# Patient Record
Sex: MALE | Race: WHITE | URBAN - METROPOLITAN AREA
[De-identification: names, ages, dates, MRNs, and addresses within clinical notes are randomized per-mention and may not be internally consistent; named-entity substitution may affect disease eponyms.]

---

## 2017-07-17 ENCOUNTER — OFFICE VISIT (OUTPATIENT)
Dept: INTERNAL MEDICINE | Facility: CLINIC | Age: 82
End: 2017-07-17
Payer: COMMERCIAL

## 2017-07-17 VITALS
SYSTOLIC BLOOD PRESSURE: 122 MMHG | DIASTOLIC BLOOD PRESSURE: 70 MMHG | HEART RATE: 96 BPM | HEIGHT: 65 IN | OXYGEN SATURATION: 96 % | WEIGHT: 147.7 LBS | TEMPERATURE: 97.4 F | BODY MASS INDEX: 24.61 KG/M2

## 2017-07-17 DIAGNOSIS — Q05.7 SPINA BIFIDA OF LUMBAR REGION WITHOUT HYDROCEPHALUS (H): ICD-10-CM

## 2017-07-17 DIAGNOSIS — M54.50 MIDLINE LOW BACK PAIN WITHOUT SCIATICA, UNSPECIFIED CHRONICITY: ICD-10-CM

## 2017-07-17 DIAGNOSIS — Z00.00 MEDICARE ANNUAL WELLNESS VISIT, SUBSEQUENT: Primary | ICD-10-CM

## 2017-07-17 DIAGNOSIS — I10 ESSENTIAL HYPERTENSION WITH GOAL BLOOD PRESSURE LESS THAN 140/90: ICD-10-CM

## 2017-07-17 DIAGNOSIS — E55.9 VITAMIN D DEFICIENCY: ICD-10-CM

## 2017-07-17 DIAGNOSIS — M15.9 OSTEOARTHRITIS OF MULTIPLE JOINTS, UNSPECIFIED OSTEOARTHRITIS TYPE: ICD-10-CM

## 2017-07-17 DIAGNOSIS — I10 ESSENTIAL HYPERTENSION: ICD-10-CM

## 2017-07-17 DIAGNOSIS — E78.5 HYPERLIPIDEMIA WITH TARGET LDL LESS THAN 130: ICD-10-CM

## 2017-07-17 LAB
ANION GAP SERPL CALCULATED.3IONS-SCNC: 5 MMOL/L (ref 3–14)
BUN SERPL-MCNC: 12 MG/DL (ref 7–30)
CALCIUM SERPL-MCNC: 9.4 MG/DL (ref 8.5–10.1)
CHLORIDE SERPL-SCNC: 104 MMOL/L (ref 94–109)
CO2 SERPL-SCNC: 30 MMOL/L (ref 20–32)
CREAT SERPL-MCNC: 0.91 MG/DL (ref 0.66–1.25)
GFR SERPL CREATININE-BSD FRML MDRD: 79 ML/MIN/1.7M2
GLUCOSE SERPL-MCNC: 97 MG/DL (ref 70–99)
POTASSIUM SERPL-SCNC: 4.1 MMOL/L (ref 3.4–5.3)
SODIUM SERPL-SCNC: 139 MMOL/L (ref 133–144)

## 2017-07-17 PROCEDURE — 99397 PER PM REEVAL EST PAT 65+ YR: CPT | Performed by: INTERNAL MEDICINE

## 2017-07-17 PROCEDURE — 36415 COLL VENOUS BLD VENIPUNCTURE: CPT | Performed by: INTERNAL MEDICINE

## 2017-07-17 PROCEDURE — 80048 BASIC METABOLIC PNL TOTAL CA: CPT | Performed by: INTERNAL MEDICINE

## 2017-07-17 RX ORDER — ACETAMINOPHEN 500 MG
1000 TABLET ORAL 2 TIMES DAILY PRN
Status: ON HOLD | COMMUNITY
Start: 2017-07-17 | End: 2019-01-01

## 2017-07-17 NOTE — PROGRESS NOTES
SUBJECTIVE:   Pepito Hicks is a 87 year old male who presents for Preventive Visit.  Are you in the first 12 months of your Medicare Part B coverage?  No    Healthy Habits:    Do you get at least three servings of calcium containing foods daily (dairy, green leafy vegetables, etc.)? No     Amount of exercise or daily activities, outside of work: a little walking     Problems taking medications regularly No    Medication side effects: No    Have you had an eye exam in the past two years? yes    Do you see a dentist twice per year? No, has dentures     Do you have sleep apnea, excessive snoring or daytime drowsiness?no    COGNITIVE SCREEN  1) Repeat 3 items (Banana, Sunrise, Chair)   2) Clock draw: ABNORMAL, hands not in the correct spot   3) 3 item recall: Recalls NO objects   Results: 0 items recalled: PROBABLE COGNITIVE IMPAIRMENT, **INFORM PROVIDER**    Mini-CogTM Copyright NABEEL Gutierrez. Licensed by the author for use in [facility]; reprinted with permission (joshua@[facility]). All rights reserved.      Additional issues to address:  1.  Follow-up HTN.  Patient is checking outpatient blood pressures, at home.  Results are 106//92.  He reports no side effects from BP medications.   No orthostatic symptoms.    2.  Using cane at home and when out.    - While making breakfast in January, had a fall while carrying milk to fridge.   Bruised, very sore, but no significant injuries.   R foot and toes ecchymotic.  R shoulder pain for a while.   Didn't injure his spine, head.    - Used tylenol 1000 mg bid through March, little now.    -   3.  Low back pain, waist area in middle, ongoing.   Worst when more active.   Whole spinal area is tender to touch.       Reviewed and updated as needed this visit by clinical staff  Tobacco  Allergies         Reviewed and updated as needed this visit by Provider        Social History   Substance Use Topics     Smoking status: Never Smoker     Smokeless tobacco:  "Never Used     Alcohol use Yes      Comment: SOCIAL       The patient does not drink >3 drinks per day nor >7 drinks per week.    Today's PHQ-2 Score:   PHQ-2 ( 1999 Pfizer) 7/17/2017 7/17/2017   Q1: Little interest or pleasure in doing things 0 0   Q2: Feeling down, depressed or hopeless 0 0   PHQ-2 Score 0 0       Do you feel safe in your environment - Yes    Do you have a Health Care Directive?: Yes: Advance Directive has been received and scanned.    Current providers sharing in care for this patient include:   Patient Care Team:  Mikal Roque MD as PCP - General (Internal Medicine)      Hearing impairment: Yes, Need to ask people to speak up or repeat themselves.    Ability to successfully perform activities of daily living: No, needs assistance with: preparing meals     Fall risk:  Fallen 2 or more times in the past year?: No  Any fall with injury in the past year?: Yes  Timed Up and Go Test (>13.5 is fall risk; contact physician) : 15    Home safety:  none identified      The following health maintenance items are reviewed in Epic and correct as of today:  Health Maintenance   Topic Date Due     FALL RISK ASSESSMENT  09/25/2015     ADVANCE DIRECTIVE PLANNING Q5 YRS  08/07/2020     TETANUS IMMUNIZATION (SYSTEM ASSIGNED)  10/02/2023     PNEUMOCOCCAL  Completed       ROS:  Constitutional, HEENT, cardiovascular, pulmonary, gi and gu systems are negative, except as otherwise noted.    OBJECTIVE:   /70  Pulse 96  Temp 97.4  F (36.3  C) (Oral)  Ht 5' 5\" (1.651 m)  Wt 147 lb 11.2 oz (67 kg)  SpO2 96%  BMI 24.58 kg/m2 Estimated body mass index is 24.06 kg/(m^2) as calculated from the following:    Height as of 7/1/16: 5' 5\" (1.651 m).    Weight as of 7/1/16: 144 lb 9.6 oz (65.6 kg).  EXAM:   Several lesions on top of head consistent with known recent biopsies.  GENERAL: healthy, alert and no distress  NECK: no adenopathy, no asymmetry, masses, or scars and thyroid normal to palpation  RESP: lungs " "clear to auscultation - no rales, rhonchi or wheezes  CV: regular rate and rhythm, normal S1 S2, no S3 or S4, no murmur, click or rub, no peripheral edema and peripheral pulses strong  ABDOMEN: soft, nontender, no hepatosplenomegaly, no masses and bowel sounds normal  MS: no gross musculoskeletal defects noted, no edema  Soft tissue, postop prominence lumbar area, as before.  No fluid accumulation.  Mild tenderness of posterior spine from lumbar to lower cervical spine    see labs     ASSESSMENT / PLAN:     ASSESSMENT:   1.  Annual Wellness Visit  2.  HTN, controlled   3.  Back pain after fall 6 months ago.  4.  Chronic myalgias, currently off medication  5.  Spina Bifida, still affects his walking, but managing fairly well.  6.  Possible fibromyalgia  7.  History of skin cancer, recent biopsies on scalp  8.  Vitamin D deficiency, untreated.    PLAN:  1.  Consider retrial of vitamin D 1000 units daily.    2.  Try taking fiber supplement along with the vitamin D, to prevent constipation from vitamin D.   Metamucil, citrucel, fibercon, fibersure, etc.     Alternatively, you could drink a glass of prune juice daily.    3.  Continue present medications   4.  Check labs again in a year, then see MD.       End of Life Planning:  Patient currently has an advanced directive: Yes.  Practitioner is supportive of decision.    COUNSELING:  Reviewed preventive health counseling, as reflected in patient instructions       Vision screening      Estimated body mass index is 24.06 kg/(m^2) as calculated from the following:    Height as of 7/1/16: 5' 5\" (1.651 m).    Weight as of 7/1/16: 144 lb 9.6 oz (65.6 kg).     reports that he has never smoked. He has never used smokeless tobacco.      Appropriate preventive services were discussed with this patient, including applicable screening as appropriate for cardiovascular disease, diabetes, osteopenia/osteoporosis, and glaucoma.  As appropriate for age/gender, discussed screening for " colorectal cancer, prostate cancer, breast cancer, and cervical cancer. Checklist reviewing preventive services available has been given to the patient.    Reviewed patients plan of care and provided an AVS. The Basic Care Plan (routine screening as documented in Health Maintenance) for Pepito meets the Care Plan requirement. This Care Plan has been established and reviewed with the Patient.    Counseling Resources:  ATP IV Guidelines  Pooled Cohorts Equation Calculator  Breast Cancer Risk Calculator  FRAX Risk Assessment  ICSI Preventive Guidelines  Dietary Guidelines for Americans, 2010  USDA's MyPlate  ASA Prophylaxis  Lung CA Screening    Mikal Roque MD  [facility]

## 2017-07-17 NOTE — PATIENT INSTRUCTIONS
Preventive Health Recommendations:       Male Ages 65 and over    Yearly exam:             See your health care provider every year in order to  o   Review health changes.   o   Discuss preventive care.    o   Review your medicines if your doctor has prescribed any.    Talk with your health care provider about whether you should have a test to screen for prostate cancer (PSA).    Every 3 years, have a diabetes test (fasting glucose). If you are at risk for diabetes, you should have this test more often.    Every 5 years, have a cholesterol test. Have this test more often if you are at risk for high cholesterol or heart disease.     Every 10 years, have a colonoscopy. Or, have a yearly FIT test (stool test). These exams will check for colon cancer.    Talk to with your health care provider about screening for Abdominal Aortic Aneurysm if you have a family history of AAA or have a history of smoking.  Shots:     Get a flu shot each year.     Get a tetanus shot every 10 years.     Talk to your doctor about your pneumonia vaccines. There are now two you should receive - Pneumovax (PPSV 23) and Prevnar (PCV 13).    Talk to your doctor about a shingles vaccine.     Talk to your doctor about the hepatitis B vaccine.  Nutrition:     Eat at least 5 servings of fruits and vegetables each day.     Eat whole-grain bread, whole-wheat pasta and brown rice instead of white grains and rice.     Talk to your doctor about Calcium and Vitamin D.   Lifestyle    Exercise for at least 150 minutes a week (30 minutes a day, 5 days a week). This will help you control your weight and prevent disease.     Limit alcohol to one drink per day.     No smoking.     Wear sunscreen to prevent skin cancer.     See your dentist every six months for an exam and cleaning.     See your eye doctor every 1 to 2 years to screen for conditions such as glaucoma, macular degeneration and cataracts.      PLAN:  1.  Consider retrial of vitamin D 1000 units  daily.    2.  Try taking fiber supplement along with the vitamin D, to prevent constipation from vitamin D.   Metamucil, citrucel, fibercon, fibersure, etc.     Alternatively, you could drink a glass of prune juice daily.    3.  Continue present medications   4.  Check labs again in a year, then see MD.

## 2017-07-17 NOTE — LETTER
[address] [address]  [phone]        July 23, 2018    Pepito Hicks  [address] [facility] [address]              Dear Pepito Hicks    This is to remind you that your fasting labs is due.    You may call our office at [phone] to schedule an appointment.    Please disregard this notice if you have already had your labs drawn or made an appointment.        Sincerely,        Mikal Roque MD

## 2017-07-17 NOTE — NURSING NOTE
"Chief Complaint   Patient presents with     Physical       Initial /70  Pulse 96  Temp 97.4  F (36.3  C) (Oral)  Ht 5' 5\" (1.651 m)  Wt 147 lb 11.2 oz (67 kg)  SpO2 96%  BMI 24.58 kg/m2 Estimated body mass index is 24.58 kg/(m^2) as calculated from the following:    Height as of this encounter: 5' 5\" (1.651 m).    Weight as of this encounter: 147 lb 11.2 oz (67 kg).  Medication Reconciliation: complete   rBi Humphries MA   "

## 2017-07-17 NOTE — MR AVS SNAPSHOT
After Visit Summary   7/17/2017    Pepito Hicks    MRN: [id number]           Patient Information     Date Of Birth          3/19/1930        Visit Information        Provider Department      7/17/2017 1:30 PM Mikal Roque MD [facility]        Today's Diagnoses     Medicare annual wellness visit, subsequent    -  1    Spina bifida of lumbar region without hydrocephalus (H)        Essential hypertension        Osteoarthritis of multiple joints, unspecified osteoarthritis type        Midline low back pain without sciatica, unspecified chronicity        Vitamin D deficiency        Hyperlipidemia with target LDL less than 130          Care Instructions      Preventive Health Recommendations:       Male Ages 65 and over    Yearly exam:             See your health care provider every year in order to  o   Review health changes.   o   Discuss preventive care.    o   Review your medicines if your doctor has prescribed any.    Talk with your health care provider about whether you should have a test to screen for prostate cancer (PSA).    Every 3 years, have a diabetes test (fasting glucose). If you are at risk for diabetes, you should have this test more often.    Every 5 years, have a cholesterol test. Have this test more often if you are at risk for high cholesterol or heart disease.     Every 10 years, have a colonoscopy. Or, have a yearly FIT test (stool test). These exams will check for colon cancer.    Talk to with your health care provider about screening for Abdominal Aortic Aneurysm if you have a family history of AAA or have a history of smoking.  Shots:     Get a flu shot each year.     Get a tetanus shot every 10 years.     Talk to your doctor about your pneumonia vaccines. There are now two you should receive - Pneumovax (PPSV 23) and Prevnar (PCV 13).    Talk to your doctor about a shingles vaccine.     Talk to your doctor about the hepatitis B  vaccine.  Nutrition:     Eat at least 5 servings of fruits and vegetables each day.     Eat whole-grain bread, whole-wheat pasta and brown rice instead of white grains and rice.     Talk to your doctor about Calcium and Vitamin D.   Lifestyle    Exercise for at least 150 minutes a week (30 minutes a day, 5 days a week). This will help you control your weight and prevent disease.     Limit alcohol to one drink per day.     No smoking.     Wear sunscreen to prevent skin cancer.     See your dentist every six months for an exam and cleaning.     See your eye doctor every 1 to 2 years to screen for conditions such as glaucoma, macular degeneration and cataracts.      PLAN:  1.  Consider retrial of vitamin D 1000 units daily.    2.  Try taking fiber supplement along with the vitamin D, to prevent constipation from vitamin D.   Metamucil, citrucel, fibercon, fibersure, etc.     Alternatively, you could drink a glass of prune juice daily.    3.  Continue present medications   4.  Check labs again in a year, then see MD.             Follow-ups after your visit        Future tests that were ordered for you today     Open Future Orders        Priority Expected Expires Ordered    Vitamin D Deficiency Routine 7/1/2018 7/17/2018 7/17/2017    Basic metabolic panel Routine 7/1/2018 7/17/2018 7/17/2017    PAF COMPLETED Routine 7/1/2018 7/17/2018 7/17/2017    Lipid panel reflex to direct LDL Routine 7/1/2018 7/17/2018 7/17/2017            Who to contact     If you have questions or need follow up information about today's clinic visit or your schedule please contact [facility] directly at [phone].  Normal or non-critical lab and imaging results will be communicated to you by MyChart, letter or phone within 4 business days after the clinic has received the results. If you do not hear from us within 7 days, please contact the clinic through MyChart or phone. If you have a critical or abnormal lab result,  "we will notify you by phone as soon as possible.  Submit refill requests through [facility] or call your pharmacy and they will forward the refill request to us. Please allow 3 business days for your refill to be completed.          Additional Information About Your Visit        Ubersnaphart Information     [facility] lets you send messages to your doctor, view your test results, renew your prescriptions, schedule appointments and more. To sign up, go to [email or web address] . Click on \"Log in\" on the left side of the screen, which will take you to the Welcome page. Then click on \"Sign up Now\" on the right side of the page.     You will be asked to enter the access code listed below, as well as some personal information. Please follow the directions to create your username and password.     Your access code is: [email or web address]  Expires: 10/15/2017  2:41 PM     Your access code will  in 90 days. If you need help or a new code, please call your [place] clinic or [phone].        Care EveryWhere ID     This is your Care EveryWhere ID. This could be used by other organizations to access your [place] medical records  [id number]        Your Vitals Were     Pulse Temperature Height Pulse Oximetry BMI (Body Mass Index)       96 97.4  F (36.3  C) (Oral) 5' 5\" (1.651 m) 96% 24.58 kg/m2        Blood Pressure from Last 3 Encounters:   17 122/70   16 134/76   01/22/15 130/70    Weight from Last 3 Encounters:   17 147 lb 11.2 oz (67 kg)   16 144 lb 9.6 oz (65.6 kg)   01/22/15 140 lb 6.4 oz (63.7 kg)                 Today's Medication Changes          These changes are accurate as of: 17  2:41 PM.  If you have any questions, ask your nurse or doctor.               Start taking these medicines.        Dose/Directions    acetaminophen 500 MG tablet   Commonly known as:  TYLENOL   Used for:  Osteoarthritis of multiple joints, unspecified osteoarthritis type   Started by:  Mikal Roque MD        " Dose:  1000 mg   Take 2 tablets (1,000 mg) by mouth 2 times daily as needed for mild pain   Refills:  0            Where to get your medicines      Some of these will need a paper prescription and others can be bought over the counter.  Ask your nurse if you have questions.     You don't need a prescription for these medications     acetaminophen 500 MG tablet                Primary Care Provider Office Phone # Fax #    Mikal Lonny Roque [phone]       [facility] [place] [address] [facility] [place] [address]        Equal Access to Services     [facility] Hospital[facility]ONE[facility] : Hadii aad ku hadasho Soomaali, waaxda luqadaha, qaybta kaalmada adeegyada, waxyon montez . So [facility] [phone].    ATENCIÓN: Si habla español, tiene a grimaldo disposición servicios gratuitos de asistencia lingüística. [facility] [phone].    We comply with applicable federal civil rights laws and [place] laws. We do not discriminate on the basis of race, color, national origin, age, disability sex, sexual orientation or gender identity.            Thank you!     Thank you for choosing [facility]  for your care. Our goal is always to provide you with excellent care. Hearing back from our patients is one way we can continue to improve our services. Please take a few minutes to complete the written survey that you may receive in the mail after your visit with us. Thank you!             Your Updated Medication List - Protect others around you: Learn how to safely use, store and throw away your medicines at www.disposemymeds.org.          This list is accurate as of: 7/17/17  2:41 PM.  Always use your most recent med list.                   Brand Name Dispense Instructions for use Diagnosis    acetaminophen 500 MG tablet    TYLENOL     Take 2 tablets (1,000 mg) by mouth 2 times daily as needed for mild pain    Osteoarthritis of multiple joints, unspecified osteoarthritis type        amLODIPine 2.5 MG tablet    NORVASC    90 tablet    Take 1 tablet (2.5 mg) by mouth daily    Essential hypertension with goal blood pressure less than 140/90

## 2017-10-02 DIAGNOSIS — I10 ESSENTIAL HYPERTENSION WITH GOAL BLOOD PRESSURE LESS THAN 140/90: ICD-10-CM

## 2017-10-03 RX ORDER — AMLODIPINE BESYLATE 2.5 MG/1
TABLET ORAL
Qty: 90 TABLET | Refills: 2 | Status: SHIPPED | OUTPATIENT
Start: 2017-10-03 | End: 2018-01-01

## 2018-01-01 ENCOUNTER — OFFICE VISIT (OUTPATIENT)
Dept: INTERNAL MEDICINE | Facility: CLINIC | Age: 83
End: 2018-01-01
Payer: COMMERCIAL

## 2018-01-01 ENCOUNTER — TELEPHONE (OUTPATIENT)
Dept: LAB | Facility: CLINIC | Age: 83
End: 2018-01-01

## 2018-01-01 VITALS
WEIGHT: 143.9 LBS | TEMPERATURE: 97.8 F | HEART RATE: 80 BPM | BODY MASS INDEX: 23.95 KG/M2 | SYSTOLIC BLOOD PRESSURE: 136 MMHG | RESPIRATION RATE: 12 BRPM | DIASTOLIC BLOOD PRESSURE: 76 MMHG

## 2018-01-01 DIAGNOSIS — I10 ESSENTIAL HYPERTENSION WITH GOAL BLOOD PRESSURE LESS THAN 140/90: Primary | ICD-10-CM

## 2018-01-01 DIAGNOSIS — M79.7 FIBROMYALGIA: ICD-10-CM

## 2018-01-01 DIAGNOSIS — I10 ESSENTIAL HYPERTENSION WITH GOAL BLOOD PRESSURE LESS THAN 140/90: ICD-10-CM

## 2018-01-01 DIAGNOSIS — Q05.7 SPINA BIFIDA OF LUMBAR REGION WITHOUT HYDROCEPHALUS (H): ICD-10-CM

## 2018-01-01 LAB
ANION GAP SERPL CALCULATED.3IONS-SCNC: 5 MMOL/L (ref 3–14)
BUN SERPL-MCNC: 13 MG/DL (ref 7–30)
CALCIUM SERPL-MCNC: 9.7 MG/DL (ref 8.5–10.1)
CHLORIDE SERPL-SCNC: 102 MMOL/L (ref 94–109)
CO2 SERPL-SCNC: 30 MMOL/L (ref 20–32)
CREAT SERPL-MCNC: 0.86 MG/DL (ref 0.66–1.25)
GFR SERPL CREATININE-BSD FRML MDRD: 84 ML/MIN/1.7M2
GLUCOSE SERPL-MCNC: 105 MG/DL (ref 70–99)
POTASSIUM SERPL-SCNC: 4.3 MMOL/L (ref 3.4–5.3)
SODIUM SERPL-SCNC: 137 MMOL/L (ref 133–144)

## 2018-01-01 PROCEDURE — 99214 OFFICE O/P EST MOD 30 MIN: CPT | Performed by: INTERNAL MEDICINE

## 2018-01-01 PROCEDURE — 36415 COLL VENOUS BLD VENIPUNCTURE: CPT | Performed by: INTERNAL MEDICINE

## 2018-01-01 PROCEDURE — 80048 BASIC METABOLIC PNL TOTAL CA: CPT | Performed by: INTERNAL MEDICINE

## 2018-01-01 RX ORDER — AMLODIPINE BESYLATE 2.5 MG/1
TABLET ORAL
Qty: 30 TABLET | Refills: 0 | Status: SHIPPED | OUTPATIENT
Start: 2018-01-01 | End: 2018-01-01

## 2018-01-01 RX ORDER — AMLODIPINE BESYLATE 2.5 MG/1
2.5 TABLET ORAL DAILY
Qty: 90 TABLET | Refills: 3 | Status: ON HOLD | OUTPATIENT
Start: 2018-01-01 | End: 2019-01-01

## 2018-01-01 RX ORDER — AMLODIPINE BESYLATE 2.5 MG/1
TABLET ORAL
Qty: 90 TABLET | Refills: 0 | Status: SHIPPED | OUTPATIENT
Start: 2018-01-01 | End: 2018-01-01

## 2018-06-25 NOTE — LETTER
[facility]  [address] [address]  [phone]            Pepito [place]ees[place] [id number] [facility] [address]        June 26, 2018    Dear Pepito,    While refilling your prescription today, we noticed that you are due for an appointment with your provider in the near future to follow up on blood pressure .  We will refill your prescription for 30 days, but a follow-up appointment must be made before any additional refills can be approved.     Taking care of your health is important to us and we look forward to seeing you in the near future.  Please call us at [phone] or [phone] (or use [facility]) to schedule an appointment.     Please disregard this notice if you have already made an appointment.    Sincerely,        [facility]

## 2018-06-26 NOTE — TELEPHONE ENCOUNTER
Prescription approved per [facility] Refill Protocol. Sent letter recommending follow up for blood pressure .Celina Be RN

## 2018-09-21 NOTE — LETTER
[facility]  [address] [id number] [phone]            Pepito Keesha [address] [address]        September 24, 2018    Dear Pepito,    While refilling your prescription today, we noticed that you are due for an appointment with your provider.  We will refill your prescription for 30 days, but a follow-up appointment must be made before any additional refills can be approved.     Taking care of your health is important to us and we look forward to seeing you in the near future.  Please call us at [phone] or [phone] (or use [employer]) to schedule an appointment.     Please disregard this notice if you have already made an appointment.    Sincerely,        [facility]

## 2018-09-21 NOTE — TELEPHONE ENCOUNTER
"Requested Prescriptions   Pending Prescriptions Disp Refills     amLODIPine (NORVASC) 2.5 MG tablet [Pharmacy Med Name: AMLODIPINE BESYLATE 2.5 MG TAB] 90 tablet 0    Last Written Prescription Date:  6/26/2018  Last Fill Quantity: 90,  # refills: 0   Last office visit: 7/17/2017 with prescribing provider:  7/17/2017   Future Office Visit:     Sig: TAKE 1 TABLET (2.5 MG) BY MOUTH DAILY    Calcium Channel Blockers Protocol  Failed    9/21/2018  1:27 AM       Failed - Blood pressure under 140/90 in past 12 months    BP Readings from Last 3 Encounters:   07/17/17 122/70   07/01/16 134/76   01/22/15 130/70                Failed - Recent (12 mo) or future (30 days) visit within the authorizing provider's specialty    Patient had office visit in the last 12 months or has a visit in the next 30 days with authorizing provider or within the authorizing provider's specialty.  See \"Patient Info\" tab in inbasket, or \"Choose Columns\" in Meds & Orders section of the refill encounter.           Failed - Normal serum creatinine on file in past 12 months    Recent Labs   Lab Test  07/17/17   1304   CR  0.91            Passed - Patient is age 18 or older          "

## 2018-10-15 NOTE — PROGRESS NOTES
"  SUBJECTIVE:   Pepito Hicks is a 88 year old male who presents to clinic today for the following health issues:      Hypertension Follow-up      Outpatient blood pressures are being checked at home.  Results are normal (110-120 systolic.    Low Salt Diet: no added salt      Amount of exercise or physical activity: 1 day/week for an average of 15-30 minutes    Problems taking medications regularly: No    Medication side effects: none    Diet: regular (no restrictions)            Problem list and histories reviewed & adjusted, as indicated.  Additional history:     Team appointment.  PCP is Dr. Roque.  Patient is a very pleasant but somewhat long-winded individual, who is here for follow-up HTN.  He continues on amlodipine daily, which he tolerates without any difficulty.  Needs refills for the next year.    He carries a number of diagnoses that result in mild chronic pain, including \"fibromyalgia,\" and spina bifida.  He has chronic anterior chest wall pain which is attributed to fibromyalgia, and lower extremity radicular pain.  Fortunately, the patient's pain is satisfactorily controlled with only a maximum of 1000 mg of Tylenol per day.  He asks for reassurance that this is acceptable.    No other acute issues to discuss today.    Reviewed and updated as needed this visit by clinical staff  Tobacco  Allergies  Meds       Reviewed and updated as needed this visit by Provider         ROS:  Constitutional, HEENT, cardiovascular, pulmonary, GI, , musculoskeletal, neuro, skin, endocrine and psych systems are negative, except as otherwise noted.    OBJECTIVE:     /76  Pulse 80  Temp 97.8  F (36.6  C) (Oral)  Resp 12  Wt 143 lb 14.4 oz (65.3 kg)  BMI 23.95 kg/m2  Body mass index is 23.95 kg/(m^2).  GENERAL: healthy, alert and no distress  NECK: no adenopathy, no asymmetry, masses, or scars and thyroid normal to palpation  RESP: lungs clear to auscultation - no rales, rhonchi or wheezes  CV: " regular rate and rhythm, normal S1 S2, no S3 or S4, no murmur, click or rub, no peripheral edema and peripheral pulses strong  ABDOMEN: soft, nontender, no hepatosplenomegaly, no masses and bowel sounds normal  MS: no gross musculoskeletal defects noted, no edema        ASSESSMENT/PLAN:     1. Essential hypertension with goal blood pressure less than 140/90  Continue amlodipine once daily, blood pressure reasonably well controlled.  - amLODIPine (NORVASC) 2.5 MG tablet; Take 1 tablet (2.5 mg) by mouth daily  Dispense: 90 tablet; Refill: 3  - Basic metabolic panel  (Ca, Cl, CO2, Creat, Gluc, K, Na, BUN)    2. Fibromyalgia, probable  Reassured patient that the Tylenol at maximum of 1000 mg daily is perfectly safe.  He does not take this much every day, usually only takes 1 500 mg dose daily, if that.    3. Spina bifida of lumbar region without hydrocephalus (H)  Pain control as above.          Lucio Jennings MD  [facility]

## 2018-10-15 NOTE — MR AVS SNAPSHOT
"              After Visit Summary   10/15/2018    Pepito Hicks    MRN: [id number]           Patient Information     Date Of Birth          3/19/1930        Visit Information        Provider Department      10/15/2018 2:30 PM Lucio Jennings MD [facility]        Today's Diagnoses     Essential hypertension with goal blood pressure less than 140/90    -  1    Fibromyalgia, probable        Spina bifida of lumbar region without hydrocephalus (H)           Follow-ups after your visit        Your next 10 appointments already scheduled     Jan 25, 2019 10:30 AM CST   SHORT with Mikal Roque MD   [facility] ([facility]    [address] [address] [phone]              Who to contact     If you have questions or need follow up information about today's clinic visit or your schedule please contact [facility] directly at [phone].  Normal or non-critical lab and imaging results will be communicated to you by MyChart, letter or phone within 4 business days after the clinic has received the results. If you do not hear from us within 7 days, please contact the clinic through Reduxhart or phone. If you have a critical or abnormal lab result, we will notify you by phone as soon as possible.  Submit refill requests through iConText or call your pharmacy and they will forward the refill request to us. Please allow 3 business days for your refill to be completed.          Additional Information About Your Visit        MyChart Information     iConText lets you send messages to your doctor, view your test results, renew your prescriptions, schedule appointments and more. To sign up, go to [email or web address] . Click on \"Log in\" on the left side of the screen, which will take you to the Welcome page. Then click on \"Sign up Now\" on the right side of the page.     You will be asked to " enter the access code listed below, as well as some personal information. Please follow the directions to create your username and password.     Your access code is: [email or web address]  Expires: 2019  4:09 PM     Your access code will  in 90 days. If you need help or a new code, please call your [facility] clinic or [phone].        Care EveryWhere ID     This is your Care EveryWhere ID. This could be used by other organizations to access your [facility] medical records  [id number]        Your Vitals Were     Pulse Temperature Respirations BMI (Body Mass Index)          80 97.8  F (36.6  C) (Oral) 12 23.95 kg/m2         Blood Pressure from Last 3 Encounters:   10/15/18 136/76   17 122/70   16 134/76    Weight from Last 3 Encounters:   10/15/18 143 lb 14.4 oz (65.3 kg)   17 147 lb 11.2 oz (67 kg)   16 144 lb 9.6 oz (65.6 kg)              We Performed the Following     Basic metabolic panel  (Ca, Cl, CO2, Creat, Gluc, K, Na, BUN)          Where to get your medicines      These medications were sent to [facility] [address] [place] [id number] [place] [id number]     Phone:  [phone]     amLODIPine 2.5 MG tablet          Primary Care Provider Office Phone # Fax #    Mikal Lonny Roque [phone]       [address] [address]        Equal Access to Services     [facility]: Hadii ge reagan hadasho Sohilario, waaxda luqadaha, qaybta kaalmada christi, erum montez . So [facility] [phone].    ATENCIÓN: Si habla español, tiene a grimaldo disposición servicios gratuitos de asistencia lingüística. Llame al [phone].    We comply with applicable federal civil rights laws and [place] laws. We do not discriminate on the basis of race, color, national origin, age, disability, sex, sexual orientation, or gender identity.            Thank you!     Thank you for choosing [facility]   for your care. Our goal is always to provide you with excellent care. Hearing back from our patients is one way we can continue to improve our services. Please take a few minutes to complete the written survey that you may receive in the mail after your visit with us. Thank you!             Your Updated Medication List - Protect others around you: Learn how to safely use, store and throw away your medicines at www.disposemymeds.org.          This list is accurate as of 10/15/18  4:09 PM.  Always use your most recent med list.                   Brand Name Dispense Instructions for use Diagnosis    acetaminophen 500 MG tablet    TYLENOL     Take 2 tablets (1,000 mg) by mouth 2 times daily as needed for mild pain    Osteoarthritis of multiple joints, unspecified osteoarthritis type       amLODIPine 2.5 MG tablet    NORVASC    90 tablet    Take 1 tablet (2.5 mg) by mouth daily    Essential hypertension with goal blood pressure less than 140/90

## 2018-10-15 NOTE — LETTER
10/15/2018         Pepito Hicks  [address] [facility] [address]            Dear Mr. Hicks,    I am writing to inform you of the lab tests you had performed recently.      Your lab results are as follows:    Kidney function: NORMAL  Electrolytes: NORMAL  Glucose: NORMAL    Your lab results from today were all perfectly normal - please continue all medications as usual.    Thank you for allowing me to participate in your care.  If you have further questions, please contact us at [phone].      Sincerely,        CHANDRIKA Jennings MD  Dept. of Internal Medicine  [facility]

## 2019-01-01 ENCOUNTER — APPOINTMENT (OUTPATIENT)
Dept: SPEECH THERAPY | Facility: CLINIC | Age: 84
DRG: 061 | End: 2019-01-01
Payer: COMMERCIAL

## 2019-01-01 ENCOUNTER — APPOINTMENT (OUTPATIENT)
Dept: SPEECH THERAPY | Facility: CLINIC | Age: 84
DRG: 061 | End: 2019-01-01
Attending: HOSPITALIST
Payer: COMMERCIAL

## 2019-01-01 ENCOUNTER — APPOINTMENT (OUTPATIENT)
Dept: OCCUPATIONAL THERAPY | Facility: CLINIC | Age: 84
DRG: 061 | End: 2019-01-01
Payer: COMMERCIAL

## 2019-01-01 ENCOUNTER — NURSING HOME VISIT (OUTPATIENT)
Dept: GERIATRICS | Facility: CLINIC | Age: 84
End: 2019-01-01
Payer: MEDICARE

## 2019-01-01 ENCOUNTER — APPOINTMENT (OUTPATIENT)
Dept: GENERAL RADIOLOGY | Facility: CLINIC | Age: 84
DRG: 061 | End: 2019-01-01
Attending: INTERNAL MEDICINE
Payer: COMMERCIAL

## 2019-01-01 ENCOUNTER — APPOINTMENT (OUTPATIENT)
Dept: PHYSICAL THERAPY | Facility: CLINIC | Age: 84
DRG: 061 | End: 2019-01-01
Payer: COMMERCIAL

## 2019-01-01 ENCOUNTER — APPOINTMENT (OUTPATIENT)
Dept: CT IMAGING | Facility: CLINIC | Age: 84
DRG: 061 | End: 2019-01-01
Attending: EMERGENCY MEDICINE
Payer: COMMERCIAL

## 2019-01-01 ENCOUNTER — APPOINTMENT (OUTPATIENT)
Dept: PHYSICAL THERAPY | Facility: CLINIC | Age: 84
DRG: 061 | End: 2019-01-01
Attending: HOSPITALIST
Payer: COMMERCIAL

## 2019-01-01 ENCOUNTER — APPOINTMENT (OUTPATIENT)
Dept: OCCUPATIONAL THERAPY | Facility: CLINIC | Age: 84
DRG: 061 | End: 2019-01-01
Attending: HOSPITALIST
Payer: COMMERCIAL

## 2019-01-01 ENCOUNTER — TELEPHONE (OUTPATIENT)
Dept: INTERNAL MEDICINE | Facility: CLINIC | Age: 84
End: 2019-01-01

## 2019-01-01 ENCOUNTER — OFFICE VISIT (OUTPATIENT)
Dept: URGENT CARE | Facility: URGENT CARE | Age: 84
End: 2019-01-01
Payer: COMMERCIAL

## 2019-01-01 ENCOUNTER — APPOINTMENT (OUTPATIENT)
Dept: CARDIOLOGY | Facility: CLINIC | Age: 84
DRG: 061 | End: 2019-01-01
Attending: HOSPITALIST
Payer: COMMERCIAL

## 2019-01-01 ENCOUNTER — APPOINTMENT (OUTPATIENT)
Dept: MRI IMAGING | Facility: CLINIC | Age: 84
DRG: 061 | End: 2019-01-01
Attending: PSYCHIATRY & NEUROLOGY
Payer: COMMERCIAL

## 2019-01-01 ENCOUNTER — APPOINTMENT (OUTPATIENT)
Dept: ULTRASOUND IMAGING | Facility: CLINIC | Age: 84
DRG: 061 | End: 2019-01-01
Attending: HOSPITALIST
Payer: COMMERCIAL

## 2019-01-01 ENCOUNTER — HOSPITAL ENCOUNTER (INPATIENT)
Facility: CLINIC | Age: 84
LOS: 6 days | Discharge: HOSPICE | DRG: 061 | End: 2019-05-26
Attending: EMERGENCY MEDICINE | Admitting: HOSPITALIST
Payer: COMMERCIAL

## 2019-01-01 VITALS
RESPIRATION RATE: 17 BRPM | SYSTOLIC BLOOD PRESSURE: 150 MMHG | TEMPERATURE: 98 F | WEIGHT: 141.09 LBS | DIASTOLIC BLOOD PRESSURE: 99 MMHG | OXYGEN SATURATION: 92 % | HEIGHT: 65 IN | BODY MASS INDEX: 23.51 KG/M2 | HEART RATE: 114 BPM

## 2019-01-01 VITALS
OXYGEN SATURATION: 99 % | HEART RATE: 88 BPM | RESPIRATION RATE: 20 BRPM | DIASTOLIC BLOOD PRESSURE: 64 MMHG | TEMPERATURE: 97.8 F | SYSTOLIC BLOOD PRESSURE: 118 MMHG

## 2019-01-01 VITALS
HEART RATE: 96 BPM | RESPIRATION RATE: 20 BRPM | TEMPERATURE: 98 F | OXYGEN SATURATION: 96 % | DIASTOLIC BLOOD PRESSURE: 89 MMHG | SYSTOLIC BLOOD PRESSURE: 146 MMHG

## 2019-01-01 DIAGNOSIS — R45.1 AGITATION: ICD-10-CM

## 2019-01-01 DIAGNOSIS — S80.829A BLISTER OF LEG: ICD-10-CM

## 2019-01-01 DIAGNOSIS — T07.XXXA MULTIPLE OPEN WOUNDS: ICD-10-CM

## 2019-01-01 DIAGNOSIS — Q05.7 SPINA BIFIDA OF LUMBAR REGION WITHOUT HYDROCEPHALUS (H): Primary | ICD-10-CM

## 2019-01-01 DIAGNOSIS — E11.8 TYPE 2 DIABETES MELLITUS WITH COMPLICATION, WITHOUT LONG-TERM CURRENT USE OF INSULIN (H): ICD-10-CM

## 2019-01-01 DIAGNOSIS — K59.00 CONSTIPATION, UNSPECIFIED CONSTIPATION TYPE: ICD-10-CM

## 2019-01-01 DIAGNOSIS — I50.23 ACUTE ON CHRONIC SYSTOLIC CONGESTIVE HEART FAILURE (H): ICD-10-CM

## 2019-01-01 DIAGNOSIS — I48.91 ATRIAL FIBRILLATION, UNSPECIFIED TYPE (H): ICD-10-CM

## 2019-01-01 DIAGNOSIS — R52 PAIN: ICD-10-CM

## 2019-01-01 DIAGNOSIS — I63.89 CEREBROVASCULAR ACCIDENT (CVA) DUE TO OTHER MECHANISM (H): Primary | ICD-10-CM

## 2019-01-01 DIAGNOSIS — I63.9 CEREBROVASCULAR ACCIDENT (CVA), UNSPECIFIED MECHANISM (H): ICD-10-CM

## 2019-01-01 DIAGNOSIS — Z51.5 HOSPICE CARE PATIENT: ICD-10-CM

## 2019-01-01 DIAGNOSIS — S81.809A OPEN WOUND OF LOWER EXTREMITY, UNSPECIFIED LATERALITY, INITIAL ENCOUNTER: Primary | ICD-10-CM

## 2019-01-01 LAB
ALBUMIN SERPL-MCNC: 3.2 G/DL (ref 3.4–5)
ALP SERPL-CCNC: 81 U/L (ref 40–150)
ALT SERPL W P-5'-P-CCNC: 36 U/L (ref 0–70)
ANION GAP SERPL CALCULATED.3IONS-SCNC: 4 MMOL/L (ref 3–14)
ANION GAP SERPL CALCULATED.3IONS-SCNC: 4 MMOL/L (ref 3–14)
ANION GAP SERPL CALCULATED.3IONS-SCNC: 6 MMOL/L (ref 3–14)
ANION GAP SERPL CALCULATED.3IONS-SCNC: 7 MMOL/L (ref 3–14)
APTT PPP: 35 SEC (ref 22–37)
AST SERPL W P-5'-P-CCNC: 34 U/L (ref 0–45)
BACTERIA SPEC CULT: ABNORMAL
BASOPHILS # BLD AUTO: 0 10E9/L (ref 0–0.2)
BASOPHILS # BLD AUTO: 0 10E9/L (ref 0–0.2)
BASOPHILS NFR BLD AUTO: 0.3 %
BASOPHILS NFR BLD AUTO: 0.4 %
BILIRUB DIRECT SERPL-MCNC: 0.4 MG/DL (ref 0–0.2)
BILIRUB SERPL-MCNC: 0.7 MG/DL (ref 0.2–1.3)
BUN SERPL-MCNC: 21 MG/DL (ref 7–30)
BUN SERPL-MCNC: 22 MG/DL (ref 7–30)
BUN SERPL-MCNC: 28 MG/DL (ref 7–30)
BUN SERPL-MCNC: 29 MG/DL (ref 7–30)
CALCIUM SERPL-MCNC: 8.2 MG/DL (ref 8.5–10.1)
CALCIUM SERPL-MCNC: 8.4 MG/DL (ref 8.5–10.1)
CALCIUM SERPL-MCNC: 8.5 MG/DL (ref 8.5–10.1)
CALCIUM SERPL-MCNC: 8.7 MG/DL (ref 8.5–10.1)
CHLORIDE SERPL-SCNC: 107 MMOL/L (ref 94–109)
CHLORIDE SERPL-SCNC: 109 MMOL/L (ref 94–109)
CHLORIDE SERPL-SCNC: 109 MMOL/L (ref 94–109)
CHLORIDE SERPL-SCNC: 110 MMOL/L (ref 94–109)
CHOLEST SERPL-MCNC: 127 MG/DL
CK SERPL-CCNC: 136 U/L (ref 30–300)
CO2 SERPL-SCNC: 29 MMOL/L (ref 20–32)
CO2 SERPL-SCNC: 31 MMOL/L (ref 20–32)
CO2 SERPL-SCNC: 32 MMOL/L (ref 20–32)
CO2 SERPL-SCNC: 33 MMOL/L (ref 20–32)
CREAT SERPL-MCNC: 0.8 MG/DL (ref 0.66–1.25)
CREAT SERPL-MCNC: 0.82 MG/DL (ref 0.66–1.25)
CREAT SERPL-MCNC: 0.84 MG/DL (ref 0.66–1.25)
CREAT SERPL-MCNC: 0.98 MG/DL (ref 0.66–1.25)
CREAT SERPL-MCNC: 0.98 MG/DL (ref 0.66–1.25)
DIFFERENTIAL METHOD BLD: ABNORMAL
DIFFERENTIAL METHOD BLD: ABNORMAL
EOSINOPHIL # BLD AUTO: 0 10E9/L (ref 0–0.7)
EOSINOPHIL # BLD AUTO: 0.3 10E9/L (ref 0–0.7)
EOSINOPHIL NFR BLD AUTO: 0.1 %
EOSINOPHIL NFR BLD AUTO: 5.5 %
ERYTHROCYTE [DISTWIDTH] IN BLOOD BY AUTOMATED COUNT: 14.4 % (ref 10–15)
ERYTHROCYTE [DISTWIDTH] IN BLOOD BY AUTOMATED COUNT: 14.4 % (ref 10–15)
GFR SERPL CREATININE-BSD FRML MDRD: 68 ML/MIN/{1.73_M2}
GFR SERPL CREATININE-BSD FRML MDRD: 68 ML/MIN/{1.73_M2}
GFR SERPL CREATININE-BSD FRML MDRD: 78 ML/MIN/{1.73_M2}
GFR SERPL CREATININE-BSD FRML MDRD: 78 ML/MIN/{1.73_M2}
GFR SERPL CREATININE-BSD FRML MDRD: 79 ML/MIN/{1.73_M2}
GLUCOSE BLDC GLUCOMTR-MCNC: 102 MG/DL (ref 70–99)
GLUCOSE BLDC GLUCOMTR-MCNC: 102 MG/DL (ref 70–99)
GLUCOSE BLDC GLUCOMTR-MCNC: 107 MG/DL (ref 70–99)
GLUCOSE BLDC GLUCOMTR-MCNC: 107 MG/DL (ref 70–99)
GLUCOSE BLDC GLUCOMTR-MCNC: 108 MG/DL (ref 70–99)
GLUCOSE BLDC GLUCOMTR-MCNC: 108 MG/DL (ref 70–99)
GLUCOSE BLDC GLUCOMTR-MCNC: 110 MG/DL (ref 70–99)
GLUCOSE BLDC GLUCOMTR-MCNC: 110 MG/DL (ref 70–99)
GLUCOSE BLDC GLUCOMTR-MCNC: 112 MG/DL (ref 70–99)
GLUCOSE BLDC GLUCOMTR-MCNC: 113 MG/DL (ref 70–99)
GLUCOSE BLDC GLUCOMTR-MCNC: 114 MG/DL (ref 70–99)
GLUCOSE BLDC GLUCOMTR-MCNC: 114 MG/DL (ref 70–99)
GLUCOSE BLDC GLUCOMTR-MCNC: 115 MG/DL (ref 70–99)
GLUCOSE BLDC GLUCOMTR-MCNC: 115 MG/DL (ref 70–99)
GLUCOSE BLDC GLUCOMTR-MCNC: 116 MG/DL (ref 70–99)
GLUCOSE BLDC GLUCOMTR-MCNC: 117 MG/DL (ref 70–99)
GLUCOSE BLDC GLUCOMTR-MCNC: 118 MG/DL (ref 70–99)
GLUCOSE BLDC GLUCOMTR-MCNC: 122 MG/DL (ref 70–99)
GLUCOSE BLDC GLUCOMTR-MCNC: 130 MG/DL (ref 70–99)
GLUCOSE BLDC GLUCOMTR-MCNC: 142 MG/DL (ref 70–99)
GLUCOSE BLDC GLUCOMTR-MCNC: 160 MG/DL (ref 70–99)
GLUCOSE BLDC GLUCOMTR-MCNC: 169 MG/DL (ref 70–99)
GLUCOSE BLDC GLUCOMTR-MCNC: 88 MG/DL (ref 70–99)
GLUCOSE BLDC GLUCOMTR-MCNC: 88 MG/DL (ref 70–99)
GLUCOSE BLDC GLUCOMTR-MCNC: 89 MG/DL (ref 70–99)
GLUCOSE BLDC GLUCOMTR-MCNC: 93 MG/DL (ref 70–99)
GLUCOSE BLDC GLUCOMTR-MCNC: 98 MG/DL (ref 70–99)
GLUCOSE BLDC GLUCOMTR-MCNC: 98 MG/DL (ref 70–99)
GLUCOSE BLDC GLUCOMTR-MCNC: 99 MG/DL (ref 70–99)
GLUCOSE SERPL-MCNC: 112 MG/DL (ref 70–99)
GLUCOSE SERPL-MCNC: 121 MG/DL (ref 70–99)
GLUCOSE SERPL-MCNC: 121 MG/DL (ref 70–99)
GLUCOSE SERPL-MCNC: 123 MG/DL (ref 70–99)
HBA1C MFR BLD: 6.8 % (ref 0–5.6)
HCT VFR BLD AUTO: 38 % (ref 40–53)
HCT VFR BLD AUTO: 40.1 % (ref 40–53)
HDLC SERPL-MCNC: 58 MG/DL
HGB BLD-MCNC: 11.9 G/DL (ref 13.3–17.7)
HGB BLD-MCNC: 12.6 G/DL (ref 13.3–17.7)
IMM GRANULOCYTES # BLD: 0 10E9/L (ref 0–0.4)
IMM GRANULOCYTES # BLD: 0 10E9/L (ref 0–0.4)
IMM GRANULOCYTES NFR BLD: 0 %
IMM GRANULOCYTES NFR BLD: 0.1 %
INR PPP: 1.19 (ref 0.86–1.14)
INTERPRETATION ECG - MUSE: NORMAL
LACTATE BLD-SCNC: 1.1 MMOL/L (ref 0.7–2)
LDLC SERPL CALC-MCNC: 56 MG/DL
LYMPHOCYTES # BLD AUTO: 0.4 10E9/L (ref 0.8–5.3)
LYMPHOCYTES # BLD AUTO: 0.7 10E9/L (ref 0.8–5.3)
LYMPHOCYTES NFR BLD AUTO: 12.1 %
LYMPHOCYTES NFR BLD AUTO: 5.4 %
MCH RBC QN AUTO: 33.1 PG (ref 26.5–33)
MCH RBC QN AUTO: 33.2 PG (ref 26.5–33)
MCHC RBC AUTO-ENTMCNC: 31.3 G/DL (ref 31.5–36.5)
MCHC RBC AUTO-ENTMCNC: 31.4 G/DL (ref 31.5–36.5)
MCV RBC AUTO: 105 FL (ref 78–100)
MCV RBC AUTO: 106 FL (ref 78–100)
MONOCYTES # BLD AUTO: 0.6 10E9/L (ref 0–1.3)
MONOCYTES # BLD AUTO: 1.1 10E9/L (ref 0–1.3)
MONOCYTES NFR BLD AUTO: 10 %
MONOCYTES NFR BLD AUTO: 14.7 %
MRSA DNA SPEC QL NAA+PROBE: POSITIVE
NEUTROPHILS # BLD AUTO: 4.1 10E9/L (ref 1.6–8.3)
NEUTROPHILS # BLD AUTO: 5.9 10E9/L (ref 1.6–8.3)
NEUTROPHILS NFR BLD AUTO: 72 %
NEUTROPHILS NFR BLD AUTO: 79.4 %
NONHDLC SERPL-MCNC: 69 MG/DL
NRBC # BLD AUTO: 0 10*3/UL
NRBC # BLD AUTO: 0 10*3/UL
NRBC BLD AUTO-RTO: 0 /100
NRBC BLD AUTO-RTO: 0 /100
NT-PROBNP SERPL-MCNC: 9323 PG/ML (ref 0–1800)
PLATELET # BLD AUTO: 203 10E9/L (ref 150–450)
PLATELET # BLD AUTO: 208 10E9/L (ref 150–450)
POTASSIUM SERPL-SCNC: 3.6 MMOL/L (ref 3.4–5.3)
POTASSIUM SERPL-SCNC: 3.7 MMOL/L (ref 3.4–5.3)
POTASSIUM SERPL-SCNC: 3.8 MMOL/L (ref 3.4–5.3)
POTASSIUM SERPL-SCNC: 4.2 MMOL/L (ref 3.4–5.3)
PROT SERPL-MCNC: 6.5 G/DL (ref 6.8–8.8)
RBC # BLD AUTO: 3.58 10E12/L (ref 4.4–5.9)
RBC # BLD AUTO: 3.81 10E12/L (ref 4.4–5.9)
SODIUM SERPL-SCNC: 144 MMOL/L (ref 133–144)
SODIUM SERPL-SCNC: 145 MMOL/L (ref 133–144)
SODIUM SERPL-SCNC: 146 MMOL/L (ref 133–144)
SPECIMEN SOURCE: ABNORMAL
SPECIMEN SOURCE: ABNORMAL
TRIGL SERPL-MCNC: 63 MG/DL
TROPONIN I SERPL-MCNC: 0.1 UG/L (ref 0–0.04)
TROPONIN I SERPL-MCNC: 0.12 UG/L (ref 0–0.04)
TROPONIN I SERPL-MCNC: 0.12 UG/L (ref 0–0.04)
TSH SERPL DL<=0.005 MIU/L-ACNC: 2.02 MU/L (ref 0.4–4)
WBC # BLD AUTO: 5.7 10E9/L (ref 4–11)
WBC # BLD AUTO: 7.4 10E9/L (ref 4–11)

## 2019-01-01 PROCEDURE — 36415 COLL VENOUS BLD VENIPUNCTURE: CPT | Performed by: INTERNAL MEDICINE

## 2019-01-01 PROCEDURE — 25000125 ZZHC RX 250: Performed by: INTERNAL MEDICINE

## 2019-01-01 PROCEDURE — 25000132 ZZH RX MED GY IP 250 OP 250 PS 637: Performed by: PSYCHIATRY & NEUROLOGY

## 2019-01-01 PROCEDURE — 40000275 ZZH STATISTIC RCP TIME EA 10 MIN

## 2019-01-01 PROCEDURE — 92526 ORAL FUNCTION THERAPY: CPT | Mod: GN | Performed by: SPEECH-LANGUAGE PATHOLOGIST

## 2019-01-01 PROCEDURE — 85610 PROTHROMBIN TIME: CPT | Performed by: EMERGENCY MEDICINE

## 2019-01-01 PROCEDURE — 83605 ASSAY OF LACTIC ACID: CPT | Performed by: INTERNAL MEDICINE

## 2019-01-01 PROCEDURE — 97110 THERAPEUTIC EXERCISES: CPT | Mod: GP

## 2019-01-01 PROCEDURE — 25000128 H RX IP 250 OP 636: Performed by: INTERNAL MEDICINE

## 2019-01-01 PROCEDURE — G0463 HOSPITAL OUTPT CLINIC VISIT: HCPCS

## 2019-01-01 PROCEDURE — 83880 ASSAY OF NATRIURETIC PEPTIDE: CPT | Performed by: INTERNAL MEDICINE

## 2019-01-01 PROCEDURE — 3E03317 INTRODUCTION OF OTHER THROMBOLYTIC INTO PERIPHERAL VEIN, PERCUTANEOUS APPROACH: ICD-10-PCS | Performed by: EMERGENCY MEDICINE

## 2019-01-01 PROCEDURE — 99239 HOSP IP/OBS DSCHRG MGMT >30: CPT | Mod: GW | Performed by: INTERNAL MEDICINE

## 2019-01-01 PROCEDURE — 97530 THERAPEUTIC ACTIVITIES: CPT | Mod: GP

## 2019-01-01 PROCEDURE — 99214 OFFICE O/P EST MOD 30 MIN: CPT | Performed by: FAMILY MEDICINE

## 2019-01-01 PROCEDURE — 80048 BASIC METABOLIC PNL TOTAL CA: CPT | Performed by: INTERNAL MEDICINE

## 2019-01-01 PROCEDURE — 80048 BASIC METABOLIC PNL TOTAL CA: CPT | Performed by: EMERGENCY MEDICINE

## 2019-01-01 PROCEDURE — 70498 CT ANGIOGRAPHY NECK: CPT

## 2019-01-01 PROCEDURE — 00000146 ZZHCL STATISTIC GLUCOSE BY METER IP

## 2019-01-01 PROCEDURE — 84484 ASSAY OF TROPONIN QUANT: CPT | Performed by: EMERGENCY MEDICINE

## 2019-01-01 PROCEDURE — 80076 HEPATIC FUNCTION PANEL: CPT | Performed by: HOSPITALIST

## 2019-01-01 PROCEDURE — 25500064 ZZH RX 255 OP 636: Performed by: INTERNAL MEDICINE

## 2019-01-01 PROCEDURE — 25000132 ZZH RX MED GY IP 250 OP 250 PS 637: Performed by: INTERNAL MEDICINE

## 2019-01-01 PROCEDURE — 74230 X-RAY XM SWLNG FUNCJ C+: CPT

## 2019-01-01 PROCEDURE — 99285 EMERGENCY DEPT VISIT HI MDM: CPT | Mod: 25

## 2019-01-01 PROCEDURE — 99233 SBSQ HOSP IP/OBS HIGH 50: CPT | Performed by: INTERNAL MEDICINE

## 2019-01-01 PROCEDURE — 94640 AIRWAY INHALATION TREATMENT: CPT

## 2019-01-01 PROCEDURE — 84443 ASSAY THYROID STIM HORMONE: CPT | Performed by: HOSPITALIST

## 2019-01-01 PROCEDURE — 82565 ASSAY OF CREATININE: CPT | Performed by: INTERNAL MEDICINE

## 2019-01-01 PROCEDURE — A9585 GADOBUTROL INJECTION: HCPCS | Performed by: INTERNAL MEDICINE

## 2019-01-01 PROCEDURE — 12000000 ZZH R&B MED SURG/OB

## 2019-01-01 PROCEDURE — 99233 SBSQ HOSP IP/OBS HIGH 50: CPT | Mod: GC | Performed by: PSYCHIATRY & NEUROLOGY

## 2019-01-01 PROCEDURE — 87640 STAPH A DNA AMP PROBE: CPT | Performed by: HOSPITALIST

## 2019-01-01 PROCEDURE — 99310 SBSQ NF CARE HIGH MDM 45: CPT | Mod: GW | Performed by: NURSE PRACTITIONER

## 2019-01-01 PROCEDURE — 82550 ASSAY OF CK (CPK): CPT | Performed by: HOSPITALIST

## 2019-01-01 PROCEDURE — 25000128 H RX IP 250 OP 636: Performed by: HOSPITALIST

## 2019-01-01 PROCEDURE — 25800029 ZZH RX IP 258 OP 250: Performed by: INTERNAL MEDICINE

## 2019-01-01 PROCEDURE — 99232 SBSQ HOSP IP/OBS MODERATE 35: CPT | Performed by: PSYCHIATRY & NEUROLOGY

## 2019-01-01 PROCEDURE — 71045 X-RAY EXAM CHEST 1 VIEW: CPT

## 2019-01-01 PROCEDURE — 92611 MOTION FLUOROSCOPY/SWALLOW: CPT | Mod: GN

## 2019-01-01 PROCEDURE — 83036 HEMOGLOBIN GLYCOSYLATED A1C: CPT | Performed by: HOSPITALIST

## 2019-01-01 PROCEDURE — 40000264 ECHOCARDIOGRAM COMPLETE

## 2019-01-01 PROCEDURE — 36415 COLL VENOUS BLD VENIPUNCTURE: CPT | Performed by: HOSPITALIST

## 2019-01-01 PROCEDURE — 84295 ASSAY OF SERUM SODIUM: CPT | Performed by: INTERNAL MEDICINE

## 2019-01-01 PROCEDURE — 20000003 ZZH R&B ICU

## 2019-01-01 PROCEDURE — 70551 MRI BRAIN STEM W/O DYE: CPT

## 2019-01-01 PROCEDURE — 25800030 ZZH RX IP 258 OP 636: Performed by: INTERNAL MEDICINE

## 2019-01-01 PROCEDURE — 85025 COMPLETE CBC W/AUTO DIFF WBC: CPT | Performed by: INTERNAL MEDICINE

## 2019-01-01 PROCEDURE — 96365 THER/PROPH/DIAG IV INF INIT: CPT | Mod: 59

## 2019-01-01 PROCEDURE — 87186 SC STD MICRODIL/AGAR DIL: CPT | Performed by: HOSPITALIST

## 2019-01-01 PROCEDURE — 99291 CRITICAL CARE FIRST HOUR: CPT | Performed by: PSYCHIATRY & NEUROLOGY

## 2019-01-01 PROCEDURE — 25000125 ZZHC RX 250

## 2019-01-01 PROCEDURE — 25000128 H RX IP 250 OP 636: Performed by: PSYCHIATRY & NEUROLOGY

## 2019-01-01 PROCEDURE — 97163 PT EVAL HIGH COMPLEX 45 MIN: CPT | Mod: GP | Performed by: PHYSICAL THERAPIST

## 2019-01-01 PROCEDURE — 85730 THROMBOPLASTIN TIME PARTIAL: CPT | Performed by: EMERGENCY MEDICINE

## 2019-01-01 PROCEDURE — 84484 ASSAY OF TROPONIN QUANT: CPT | Performed by: HOSPITALIST

## 2019-01-01 PROCEDURE — 97110 THERAPEUTIC EXERCISES: CPT | Mod: GO | Performed by: OCCUPATIONAL THERAPY ASSISTANT

## 2019-01-01 PROCEDURE — 25000125 ZZHC RX 250: Performed by: HOSPITALIST

## 2019-01-01 PROCEDURE — 87641 MR-STAPH DNA AMP PROBE: CPT | Performed by: HOSPITALIST

## 2019-01-01 PROCEDURE — 99292 CRITICAL CARE ADDL 30 MIN: CPT | Performed by: PSYCHIATRY & NEUROLOGY

## 2019-01-01 PROCEDURE — 97530 THERAPEUTIC ACTIVITIES: CPT | Mod: GP | Performed by: PHYSICAL THERAPIST

## 2019-01-01 PROCEDURE — 93005 ELECTROCARDIOGRAM TRACING: CPT

## 2019-01-01 PROCEDURE — 93306 TTE W/DOPPLER COMPLETE: CPT | Mod: 26 | Performed by: INTERNAL MEDICINE

## 2019-01-01 PROCEDURE — 85025 COMPLETE CBC W/AUTO DIFF WBC: CPT | Performed by: EMERGENCY MEDICINE

## 2019-01-01 PROCEDURE — 25000125 ZZHC RX 250: Performed by: EMERGENCY MEDICINE

## 2019-01-01 PROCEDURE — G0508 CRIT CARE TELEHEA CONSULT 60: HCPCS | Mod: G0 | Performed by: PSYCHIATRY & NEUROLOGY

## 2019-01-01 PROCEDURE — 93922 UPR/L XTREMITY ART 2 LEVELS: CPT

## 2019-01-01 PROCEDURE — 97535 SELF CARE MNGMENT TRAINING: CPT | Mod: GO | Performed by: OCCUPATIONAL THERAPIST

## 2019-01-01 PROCEDURE — 25000131 ZZH RX MED GY IP 250 OP 636 PS 637: Performed by: INTERNAL MEDICINE

## 2019-01-01 PROCEDURE — 97530 THERAPEUTIC ACTIVITIES: CPT | Mod: GO | Performed by: OCCUPATIONAL THERAPY ASSISTANT

## 2019-01-01 PROCEDURE — 25000128 H RX IP 250 OP 636: Performed by: EMERGENCY MEDICINE

## 2019-01-01 PROCEDURE — 99223 1ST HOSP IP/OBS HIGH 75: CPT | Mod: AI | Performed by: HOSPITALIST

## 2019-01-01 PROCEDURE — 80061 LIPID PANEL: CPT | Performed by: HOSPITALIST

## 2019-01-01 PROCEDURE — 97530 THERAPEUTIC ACTIVITIES: CPT | Mod: GO

## 2019-01-01 PROCEDURE — 92526 ORAL FUNCTION THERAPY: CPT | Mod: GN

## 2019-01-01 PROCEDURE — 70450 CT HEAD/BRAIN W/O DYE: CPT

## 2019-01-01 PROCEDURE — 96376 TX/PRO/DX INJ SAME DRUG ADON: CPT

## 2019-01-01 PROCEDURE — 99233 SBSQ HOSP IP/OBS HIGH 50: CPT | Performed by: PSYCHIATRY & NEUROLOGY

## 2019-01-01 PROCEDURE — 97530 THERAPEUTIC ACTIVITIES: CPT | Mod: GO | Performed by: OCCUPATIONAL THERAPIST

## 2019-01-01 PROCEDURE — 70460 CT HEAD/BRAIN W/DYE: CPT

## 2019-01-01 PROCEDURE — 92610 EVALUATE SWALLOWING FUNCTION: CPT | Mod: GN | Performed by: SPEECH-LANGUAGE PATHOLOGIST

## 2019-01-01 PROCEDURE — 97167 OT EVAL HIGH COMPLEX 60 MIN: CPT | Mod: GO | Performed by: OCCUPATIONAL THERAPIST

## 2019-01-01 RX ORDER — BARIUM SULFATE 400 MG/ML
SUSPENSION ORAL ONCE
Status: DISCONTINUED | OUTPATIENT
Start: 2019-01-01 | End: 2019-01-01 | Stop reason: CLARIF

## 2019-01-01 RX ORDER — IOPAMIDOL 755 MG/ML
120 INJECTION, SOLUTION INTRAVASCULAR ONCE
Status: COMPLETED | OUTPATIENT
Start: 2019-01-01 | End: 2019-01-01

## 2019-01-01 RX ORDER — BISACODYL 10 MG
10 SUPPOSITORY, RECTAL RECTAL DAILY PRN
Qty: 7 SUPPOSITORY | Refills: 0 | Status: SHIPPED | OUTPATIENT
Start: 2019-01-01

## 2019-01-01 RX ORDER — HALOPERIDOL 1 MG/1
1 TABLET ORAL EVERY 6 HOURS PRN
COMMUNITY

## 2019-01-01 RX ORDER — SENNOSIDES 8.6 MG
1 TABLET ORAL 2 TIMES DAILY PRN
Qty: 15 TABLET | Refills: 0 | Status: SHIPPED | OUTPATIENT
Start: 2019-01-01

## 2019-01-01 RX ORDER — NALOXONE HYDROCHLORIDE 0.4 MG/ML
.1-.4 INJECTION, SOLUTION INTRAMUSCULAR; INTRAVENOUS; SUBCUTANEOUS
Status: DISCONTINUED | OUTPATIENT
Start: 2019-01-01 | End: 2019-01-01 | Stop reason: HOSPADM

## 2019-01-01 RX ORDER — DEXTROSE MONOHYDRATE, SODIUM CHLORIDE, AND POTASSIUM CHLORIDE 50; 1.49; 4.5 G/1000ML; G/1000ML; G/1000ML
INJECTION, SOLUTION INTRAVENOUS CONTINUOUS
Status: DISCONTINUED | OUTPATIENT
Start: 2019-01-01 | End: 2019-01-01

## 2019-01-01 RX ORDER — NICOTINE POLACRILEX 4 MG
15-30 LOZENGE BUCCAL
Status: DISCONTINUED | OUTPATIENT
Start: 2019-01-01 | End: 2019-01-01 | Stop reason: HOSPADM

## 2019-01-01 RX ORDER — METOPROLOL TARTRATE 1 MG/ML
2.5 INJECTION, SOLUTION INTRAVENOUS ONCE
Status: COMPLETED | OUTPATIENT
Start: 2019-01-01 | End: 2019-01-01

## 2019-01-01 RX ORDER — HALOPERIDOL 0.5 MG/1
0.5 TABLET ORAL EVERY 6 HOURS PRN
Status: DISCONTINUED | OUTPATIENT
Start: 2019-01-01 | End: 2019-01-01 | Stop reason: HOSPADM

## 2019-01-01 RX ORDER — DEXTROSE MONOHYDRATE 25 G/50ML
25-50 INJECTION, SOLUTION INTRAVENOUS
Status: DISCONTINUED | OUTPATIENT
Start: 2019-01-01 | End: 2019-01-01 | Stop reason: HOSPADM

## 2019-01-01 RX ORDER — ASPIRIN 81 MG/1
81 TABLET, CHEWABLE ORAL DAILY
Status: DISCONTINUED | OUTPATIENT
Start: 2019-01-01 | End: 2019-01-01 | Stop reason: HOSPADM

## 2019-01-01 RX ORDER — LORAZEPAM 0.5 MG/1
0.25 TABLET ORAL EVERY 4 HOURS PRN
Qty: 24 TABLET | Refills: 0 | Status: SHIPPED | OUTPATIENT
Start: 2019-01-01

## 2019-01-01 RX ORDER — METOPROLOL TARTRATE 1 MG/ML
2.5 INJECTION, SOLUTION INTRAVENOUS EVERY 8 HOURS
Status: DISCONTINUED | OUTPATIENT
Start: 2019-01-01 | End: 2019-01-01

## 2019-01-01 RX ORDER — HALOPERIDOL 0.5 MG/1
0.5 TABLET ORAL EVERY 6 HOURS PRN
Qty: 15 TABLET | Refills: 0 | Status: SHIPPED | OUTPATIENT
Start: 2019-01-01 | End: 2019-01-01 | Stop reason: DRUGHIGH

## 2019-01-01 RX ORDER — ASPIRIN 300 MG/1
300 SUPPOSITORY RECTAL DAILY
Status: DISCONTINUED | OUTPATIENT
Start: 2019-01-01 | End: 2019-01-01

## 2019-01-01 RX ORDER — MORPHINE SULFATE 30 MG/1
2.5 TABLET ORAL
Qty: 15 TABLET | Refills: 0 | Status: SHIPPED | OUTPATIENT
Start: 2019-01-01 | End: 2019-01-01 | Stop reason: DRUGHIGH

## 2019-01-01 RX ORDER — ACETAMINOPHEN 650 MG/1
650 SUPPOSITORY RECTAL EVERY 4 HOURS PRN
Status: DISCONTINUED | OUTPATIENT
Start: 2019-01-01 | End: 2019-01-01 | Stop reason: HOSPADM

## 2019-01-01 RX ORDER — FUROSEMIDE 10 MG/ML
10 INJECTION INTRAMUSCULAR; INTRAVENOUS EVERY 12 HOURS
Status: DISCONTINUED | OUTPATIENT
Start: 2019-01-01 | End: 2019-01-01

## 2019-01-01 RX ORDER — BISACODYL 10 MG
10 SUPPOSITORY, RECTAL RECTAL DAILY PRN
Status: DISCONTINUED | OUTPATIENT
Start: 2019-01-01 | End: 2019-01-01 | Stop reason: HOSPADM

## 2019-01-01 RX ORDER — GADOBUTROL 604.72 MG/ML
6 INJECTION INTRAVENOUS ONCE
Status: COMPLETED | OUTPATIENT
Start: 2019-01-01 | End: 2019-01-01

## 2019-01-01 RX ORDER — METOPROLOL TARTRATE 1 MG/ML
5 INJECTION, SOLUTION INTRAVENOUS EVERY 6 HOURS
Status: DISCONTINUED | OUTPATIENT
Start: 2019-01-01 | End: 2019-01-01

## 2019-01-01 RX ORDER — SENNOSIDES 8.6 MG
8.6 TABLET ORAL 2 TIMES DAILY PRN
Status: DISCONTINUED | OUTPATIENT
Start: 2019-01-01 | End: 2019-01-01 | Stop reason: HOSPADM

## 2019-01-01 RX ORDER — ALBUTEROL SULFATE 0.83 MG/ML
2.5 SOLUTION RESPIRATORY (INHALATION) EVERY 4 HOURS PRN
Status: DISCONTINUED | OUTPATIENT
Start: 2019-01-01 | End: 2019-01-01 | Stop reason: HOSPADM

## 2019-01-01 RX ORDER — MORPHINE SULFATE 20 MG/ML
7.5 SOLUTION ORAL
COMMUNITY

## 2019-01-01 RX ORDER — ONDANSETRON 4 MG/1
4 TABLET, ORALLY DISINTEGRATING ORAL EVERY 6 HOURS PRN
Status: DISCONTINUED | OUTPATIENT
Start: 2019-01-01 | End: 2019-01-01 | Stop reason: HOSPADM

## 2019-01-01 RX ORDER — METOPROLOL TARTRATE 1 MG/ML
INJECTION, SOLUTION INTRAVENOUS
Status: COMPLETED
Start: 2019-01-01 | End: 2019-01-01

## 2019-01-01 RX ORDER — MORPHINE SULFATE 20 MG/ML
7.5 SOLUTION ORAL EVERY 4 HOURS
COMMUNITY

## 2019-01-01 RX ORDER — ONDANSETRON 2 MG/ML
4 INJECTION INTRAMUSCULAR; INTRAVENOUS EVERY 6 HOURS PRN
Status: DISCONTINUED | OUTPATIENT
Start: 2019-01-01 | End: 2019-01-01 | Stop reason: HOSPADM

## 2019-01-01 RX ORDER — MORPHINE SULFATE 30 MG/1
2.5 TABLET ORAL
Status: DISCONTINUED | OUTPATIENT
Start: 2019-01-01 | End: 2019-01-01 | Stop reason: HOSPADM

## 2019-01-01 RX ORDER — MORPHINE SULFATE 30 MG/1
5 TABLET ORAL
COMMUNITY
End: 2019-01-01 | Stop reason: DRUGHIGH

## 2019-01-01 RX ORDER — HALOPERIDOL 5 MG/ML
1-2 INJECTION INTRAMUSCULAR EVERY 6 HOURS PRN
Status: DISCONTINUED | OUTPATIENT
Start: 2019-01-01 | End: 2019-01-01 | Stop reason: HOSPADM

## 2019-01-01 RX ORDER — FUROSEMIDE 10 MG/ML
10 INJECTION INTRAMUSCULAR; INTRAVENOUS DAILY
Status: DISCONTINUED | OUTPATIENT
Start: 2019-01-01 | End: 2019-01-01

## 2019-01-01 RX ORDER — FUROSEMIDE 20 MG/1
10 TABLET ORAL DAILY
Status: DISCONTINUED | OUTPATIENT
Start: 2019-01-01 | End: 2019-01-01 | Stop reason: HOSPADM

## 2019-01-01 RX ORDER — METOPROLOL TARTRATE 25 MG/1
25 TABLET, FILM COATED ORAL 2 TIMES DAILY
Status: DISCONTINUED | OUTPATIENT
Start: 2019-01-01 | End: 2019-01-01 | Stop reason: HOSPADM

## 2019-01-01 RX ORDER — SODIUM CHLORIDE 9 MG/ML
INJECTION, SOLUTION INTRAVENOUS CONTINUOUS
Status: DISCONTINUED | OUTPATIENT
Start: 2019-01-01 | End: 2019-01-01

## 2019-01-01 RX ORDER — LABETALOL 20 MG/4 ML (5 MG/ML) INTRAVENOUS SYRINGE
10
Status: DISCONTINUED | OUTPATIENT
Start: 2019-01-01 | End: 2019-01-01 | Stop reason: HOSPADM

## 2019-01-01 RX ORDER — LORAZEPAM 0.5 MG/1
0.25 TABLET ORAL EVERY 4 HOURS PRN
Status: DISCONTINUED | OUTPATIENT
Start: 2019-01-01 | End: 2019-01-01 | Stop reason: HOSPADM

## 2019-01-01 RX ORDER — HYDRALAZINE HYDROCHLORIDE 20 MG/ML
10 INJECTION INTRAMUSCULAR; INTRAVENOUS EVERY 4 HOURS PRN
Status: DISCONTINUED | OUTPATIENT
Start: 2019-01-01 | End: 2019-01-01 | Stop reason: HOSPADM

## 2019-01-01 RX ORDER — SODIUM CHLORIDE 450 MG/100ML
INJECTION, SOLUTION INTRAVENOUS CONTINUOUS
Status: DISCONTINUED | OUTPATIENT
Start: 2019-01-01 | End: 2019-01-01

## 2019-01-01 RX ORDER — ATROPINE SULFATE 10 MG/ML
2 SOLUTION/ DROPS OPHTHALMIC
Status: DISCONTINUED | OUTPATIENT
Start: 2019-01-01 | End: 2019-01-01 | Stop reason: HOSPADM

## 2019-01-01 RX ORDER — ACETAMINOPHEN 650 MG/1
650 SUPPOSITORY RECTAL EVERY 4 HOURS PRN
Qty: 28 SUPPOSITORY | Refills: 1 | Status: SHIPPED | OUTPATIENT
Start: 2019-01-01 | End: 2019-06-02

## 2019-01-01 RX ORDER — ACETAMINOPHEN 325 MG/1
650 TABLET ORAL EVERY 4 HOURS PRN
Status: DISCONTINUED | OUTPATIENT
Start: 2019-01-01 | End: 2019-01-01 | Stop reason: HOSPADM

## 2019-01-01 RX ORDER — BARIUM SULFATE 400 MG/ML
SUSPENSION ORAL ONCE
Status: COMPLETED | OUTPATIENT
Start: 2019-01-01 | End: 2019-01-01

## 2019-01-01 RX ADMIN — ALTEPLASE 5.66 MG: KIT at 00:32

## 2019-01-01 RX ADMIN — ALBUTEROL SULFATE 2.5 MG: 2.5 SOLUTION RESPIRATORY (INHALATION) at 22:59

## 2019-01-01 RX ADMIN — ACETAMINOPHEN 650 MG: 325 TABLET, FILM COATED ORAL at 08:58

## 2019-01-01 RX ADMIN — SODIUM CHLORIDE: 4.5 INJECTION, SOLUTION INTRAVENOUS at 16:08

## 2019-01-01 RX ADMIN — ACETAMINOPHEN 650 MG: 325 TABLET, FILM COATED ORAL at 09:22

## 2019-01-01 RX ADMIN — ACETAMINOPHEN 650 MG: 650 SUPPOSITORY RECTAL at 18:35

## 2019-01-01 RX ADMIN — ASPIRIN 300 MG: 300 SUPPOSITORY RECTAL at 08:55

## 2019-01-01 RX ADMIN — METOPROLOL TARTRATE 2.5 MG: 1 INJECTION, SOLUTION INTRAVENOUS at 16:41

## 2019-01-01 RX ADMIN — ASPIRIN 81 MG 81 MG: 81 TABLET ORAL at 09:22

## 2019-01-01 RX ADMIN — METOPROLOL TARTRATE 25 MG: 25 TABLET, FILM COATED ORAL at 09:22

## 2019-01-01 RX ADMIN — FAMOTIDINE 20 MG: 10 INJECTION, SOLUTION INTRAVENOUS at 21:05

## 2019-01-01 RX ADMIN — METOPROLOL TARTRATE 5 MG: 5 INJECTION, SOLUTION INTRAVENOUS at 16:12

## 2019-01-01 RX ADMIN — IOPAMIDOL 120 ML: 755 INJECTION, SOLUTION INTRAVENOUS at 00:03

## 2019-01-01 RX ADMIN — HALOPERIDOL LACTATE 1 MG: 5 INJECTION INTRAMUSCULAR at 23:55

## 2019-01-01 RX ADMIN — METOPROLOL TARTRATE 2.5 MG: 5 INJECTION, SOLUTION INTRAVENOUS at 08:04

## 2019-01-01 RX ADMIN — METOPROLOL TARTRATE 25 MG: 25 TABLET, FILM COATED ORAL at 20:44

## 2019-01-01 RX ADMIN — METOPROLOL TARTRATE 25 MG: 25 TABLET, FILM COATED ORAL at 15:11

## 2019-01-01 RX ADMIN — FUROSEMIDE 10 MG: 10 INJECTION, SOLUTION INTRAVENOUS at 09:51

## 2019-01-01 RX ADMIN — METOPROLOL TARTRATE 5 MG: 5 INJECTION, SOLUTION INTRAVENOUS at 21:14

## 2019-01-01 RX ADMIN — SODIUM CHLORIDE: 9 INJECTION, SOLUTION INTRAVENOUS at 12:32

## 2019-01-01 RX ADMIN — METOPROLOL TARTRATE 5 MG: 5 INJECTION, SOLUTION INTRAVENOUS at 22:39

## 2019-01-01 RX ADMIN — FAMOTIDINE 20 MG: 10 INJECTION, SOLUTION INTRAVENOUS at 13:30

## 2019-01-01 RX ADMIN — POTASSIUM CHLORIDE, DEXTROSE MONOHYDRATE AND SODIUM CHLORIDE: 150; 5; 450 INJECTION, SOLUTION INTRAVENOUS at 13:44

## 2019-01-01 RX ADMIN — FUROSEMIDE 10 MG: 10 INJECTION, SOLUTION INTRAVENOUS at 10:19

## 2019-01-01 RX ADMIN — METOPROLOL TARTRATE 25 MG: 25 TABLET, FILM COATED ORAL at 08:58

## 2019-01-01 RX ADMIN — FAMOTIDINE 20 MG: 10 INJECTION, SOLUTION INTRAVENOUS at 21:47

## 2019-01-01 RX ADMIN — SODIUM CHLORIDE: 9 INJECTION, SOLUTION INTRAVENOUS at 16:40

## 2019-01-01 RX ADMIN — BARIUM SULFATE 25 ML: 400 SUSPENSION ORAL at 10:21

## 2019-01-01 RX ADMIN — METOPROLOL TARTRATE 25 MG: 25 TABLET, FILM COATED ORAL at 08:56

## 2019-01-01 RX ADMIN — METOPROLOL TARTRATE 2.5 MG: 5 INJECTION, SOLUTION INTRAVENOUS at 18:40

## 2019-01-01 RX ADMIN — SODIUM CHLORIDE 100 ML: 9 INJECTION, SOLUTION INTRAVENOUS at 01:22

## 2019-01-01 RX ADMIN — FUROSEMIDE 10 MG: 10 INJECTION, SOLUTION INTRAVENOUS at 09:03

## 2019-01-01 RX ADMIN — METOPROLOL TARTRATE 5 MG: 5 INJECTION, SOLUTION INTRAVENOUS at 04:17

## 2019-01-01 RX ADMIN — FUROSEMIDE 10 MG: 20 TABLET ORAL at 09:22

## 2019-01-01 RX ADMIN — METOPROLOL TARTRATE 2.5 MG: 5 INJECTION, SOLUTION INTRAVENOUS at 16:41

## 2019-01-01 RX ADMIN — SODIUM CHLORIDE 100 ML: 9 INJECTION, SOLUTION INTRAVENOUS at 00:03

## 2019-01-01 RX ADMIN — ACETAMINOPHEN 650 MG: 650 SUPPOSITORY RECTAL at 14:55

## 2019-01-01 RX ADMIN — FAMOTIDINE 20 MG: 10 INJECTION, SOLUTION INTRAVENOUS at 08:55

## 2019-01-01 RX ADMIN — FAMOTIDINE 20 MG: 10 INJECTION, SOLUTION INTRAVENOUS at 09:01

## 2019-01-01 RX ADMIN — FUROSEMIDE 10 MG: 20 TABLET ORAL at 08:58

## 2019-01-01 RX ADMIN — ACETAMINOPHEN 650 MG: 650 SUPPOSITORY RECTAL at 18:16

## 2019-01-01 RX ADMIN — FAMOTIDINE 20 MG: 10 INJECTION, SOLUTION INTRAVENOUS at 09:22

## 2019-01-01 RX ADMIN — ALBUTEROL SULFATE 2.5 MG: 2.5 SOLUTION RESPIRATORY (INHALATION) at 04:44

## 2019-01-01 RX ADMIN — FUROSEMIDE 10 MG: 10 INJECTION, SOLUTION INTRAVENOUS at 20:58

## 2019-01-01 RX ADMIN — ACETAMINOPHEN 650 MG: 650 SUPPOSITORY RECTAL at 11:13

## 2019-01-01 RX ADMIN — METOPROLOL TARTRATE 25 MG: 25 TABLET, FILM COATED ORAL at 21:47

## 2019-01-01 RX ADMIN — SODIUM CHLORIDE: 4.5 INJECTION, SOLUTION INTRAVENOUS at 06:28

## 2019-01-01 RX ADMIN — METOPROLOL TARTRATE 5 MG: 5 INJECTION, SOLUTION INTRAVENOUS at 03:28

## 2019-01-01 RX ADMIN — SODIUM CHLORIDE: 9 INJECTION, SOLUTION INTRAVENOUS at 10:20

## 2019-01-01 RX ADMIN — METOPROLOL TARTRATE 2.5 MG: 5 INJECTION, SOLUTION INTRAVENOUS at 00:00

## 2019-01-01 RX ADMIN — ASPIRIN 300 MG: 300 SUPPOSITORY RECTAL at 09:08

## 2019-01-01 RX ADMIN — METOPROLOL TARTRATE 25 MG: 25 TABLET, FILM COATED ORAL at 21:05

## 2019-01-01 RX ADMIN — METOPROLOL TARTRATE 2.5 MG: 5 INJECTION, SOLUTION INTRAVENOUS at 15:46

## 2019-01-01 RX ADMIN — ASPIRIN 81 MG 81 MG: 81 TABLET ORAL at 09:01

## 2019-01-01 RX ADMIN — FUROSEMIDE 10 MG: 20 TABLET ORAL at 12:23

## 2019-01-01 RX ADMIN — METOPROLOL TARTRATE 5 MG: 5 INJECTION, SOLUTION INTRAVENOUS at 09:41

## 2019-01-01 RX ADMIN — ASPIRIN 300 MG: 300 SUPPOSITORY RECTAL at 09:51

## 2019-01-01 RX ADMIN — INSULIN ASPART 1 UNITS: 100 INJECTION, SOLUTION INTRAVENOUS; SUBCUTANEOUS at 01:09

## 2019-01-01 RX ADMIN — ALTEPLASE 50.95 MG: KIT at 00:33

## 2019-01-01 RX ADMIN — GADOBUTROL 6 ML: 604.72 INJECTION INTRAVENOUS at 01:05

## 2019-01-01 RX ADMIN — METOPROLOL TARTRATE 5 MG: 5 INJECTION, SOLUTION INTRAVENOUS at 09:51

## 2019-01-01 ASSESSMENT — ACTIVITIES OF DAILY LIVING (ADL)
ADLS_ACUITY_SCORE: 26
ADLS_ACUITY_SCORE: 26
ADLS_ACUITY_SCORE: 20
ADLS_ACUITY_SCORE: 22
ADLS_ACUITY_SCORE: 18
ADLS_ACUITY_SCORE: 24
ADLS_ACUITY_SCORE: 28
ADLS_ACUITY_SCORE: 24
ADLS_ACUITY_SCORE: 26
ADLS_ACUITY_SCORE: 26
ADLS_ACUITY_SCORE: 28
ADLS_ACUITY_SCORE: 26
ADLS_ACUITY_SCORE: 20
ADLS_ACUITY_SCORE: 28
ADLS_ACUITY_SCORE: 26
ADLS_ACUITY_SCORE: 22
ADLS_ACUITY_SCORE: 26
ADLS_ACUITY_SCORE: 28
ADLS_ACUITY_SCORE: 26
ADLS_ACUITY_SCORE: 20
ADLS_ACUITY_SCORE: 28
ADLS_ACUITY_SCORE: 26
ADLS_ACUITY_SCORE: 26
ADLS_ACUITY_SCORE: 28
ADLS_ACUITY_SCORE: 28
ADLS_ACUITY_SCORE: 20
ADLS_ACUITY_SCORE: 22
ADLS_ACUITY_SCORE: 18
ADLS_ACUITY_SCORE: 22
ADLS_ACUITY_SCORE: 26
ADLS_ACUITY_SCORE: 22
ADLS_ACUITY_SCORE: 28
ADLS_ACUITY_SCORE: 26
ADLS_ACUITY_SCORE: 28
ADLS_ACUITY_SCORE: 20
ADLS_ACUITY_SCORE: 20
ADLS_ACUITY_SCORE: 22

## 2019-01-01 ASSESSMENT — ENCOUNTER SYMPTOMS
SHORTNESS OF BREATH: 0
NUMBNESS: 1
ABDOMINAL PAIN: 0
WEAKNESS: 1
FACIAL ASYMMETRY: 1
FEVER: 0
VOMITING: 0

## 2019-01-01 ASSESSMENT — MIFFLIN-ST. JEOR: SCORE: 1231.88

## 2019-05-13 NOTE — TELEPHONE ENCOUNTER
Dr. Jennings,     Pt calling with question about RX for amLODIPine (NORVASC) 2.5 MG tablet. : [employer], changed the appearance of pt's medication, and when he picked up RX re-newal about 2 weeks ago, his RX is now the round pink pills (instead if the white scottie shaped).   Pt started taking this new RX, and says that he noticied change in symptoms after he started taking this new form of medication: he is having dizziness, breathing problems (feelings like he can't catch his breath). SOB & wheezing. Panting because he feels like he is going to lose his breath. Speaking in short works but panting and having trouble speaking clearly.  Pt says that he may get SOB with excitement or activity, and says that his normal breathing is resolved within a few minutes of rest.   Advised that pt go to the ER to be evaluated for his symptoms and difficulty breathing. But pt denies, and says he doesn't want to come in to be seen.    Pt is wondering if he should continue to take his BP pills? (amLODIPine (NORVASC) 2.5 MG tablet). Please advise.  And call pt back at [phone].

## 2019-05-14 NOTE — TELEPHONE ENCOUNTER
Yes,  He should continue BP medications as currently, and follow-up in clinic in next several days if symptoms persist.

## 2019-05-17 NOTE — PROGRESS NOTES
Chief Complaint   Patient presents with     Derm Problem     blisters on legs, draining and red x 4 days     SUBJECTIVE:   Pepito Hicks is a 89 year old male with history of actinic keratosis, fibromyalgia, hyperlipidemia, spina bifida, hypertension presenting with a chief complaint of blisters on legs which are draining along with bilateral pedal edema.  He has been noticing the blisters which are clear water-filled for last couple of days.  And it has been draining continuously.  In the left lower leg there is a open wound very superficial measuring almost 5 cm x 5 cm round in shape with definite signs of healing.  Anteriorly located in the lower leg.  And there are several clear fluid-filled blisters 1 cm to 2 cm size scattered in both the lower legs of right and left side.  He has some itching but denies any pain associated with it He is an established patient of [facility].  Onset of symptoms was 1 week(s) ago.  Course of illness is worsening.    Severity moderate  Current and Associated symptoms: open wounds , blisters , pedal edema   Treatment measures tried include dressings .  Predisposing factors include venous insufficiency.    Past Medical History:   Diagnosis Date     Actinic keratosis      Fibromyalgia      Hyperlipidemia      MEDICAL HISTORY OF -     squamous cancer of scalp     Rosacea      Spina bifida without mention of hydrocephalus, unspecified region      Unspecified essential hypertension      Current Outpatient Medications   Medication Sig Dispense Refill     acetaminophen (TYLENOL) 500 MG tablet Take 2 tablets (1,000 mg) by mouth 2 times daily as needed for mild pain       amLODIPine (NORVASC) 2.5 MG tablet Take 1 tablet (2.5 mg) by mouth daily 90 tablet 3     Social History     Tobacco Use     Smoking status: Never Smoker     Smokeless tobacco: Never Used   Substance Use Topics     Alcohol use: Yes     Comment: SOCIAL     History reviewed. No pertinent family history.      ROS:    10  point ROS of systems including Constitutional, Eyes, Respiratory, Cardiovascular, Gastroenterology, Genitourinary,Muscularskeletal, Psychiatric were all negative except for pertinent positives noted in my HPI         OBJECTIVE:  /64   Pulse 88   Temp 97.8  F (36.6  C)   Resp 20   SpO2 99%   GENERAL APPEARANCE: healthy, alert and no distress  EYES: EOMI,  PERRL, conjunctiva clear  NECK: supple, nontender, no lymphadenopathy  RESP: lungs good air entry positive for rales,no  rhonchi or wheezes  CV: regular rates and rhythm, normal S1 S2, no murmur noted  SKIN several clear fluid-filled blisters noted in the lower leg bilaterally.  Which is dripping profusely  Also very superficial open wound noted in the anterior aspect of the left lower leg with very good granulation tissue measuring about 5 cm x 5 cm round in shape with no induration or cellulitis sign  There is definite 2+ pedal edema bilaterally      X-Ray was not done.    Medical Decision Making:    Differential Diagnosis:  Could be venous stasis ulcers, open wound, blisters his wound was cleaned with normal saline then dressed with    Vaseline coated nonadherent dressing  And also with Telfa  ASSESSMENT:  Pepito was seen today for derm problem.    Diagnoses and all orders for this visit:    Open wound of lower extremity, unspecified laterality, initial encounter  -     WOUND CARE REFERRAL    Blister of leg  -     WOUND CARE REFERRAL      I assured patient that he does not have any infection he should continue dressing  Keep leg elevated as much as possible to help with the drainage and circulation  Should follow-up with wound clinic for which a referral was done  Discussed  with patient and his wife to continue doing dressing on every 24 hours  Look for any signs of infection such as redness or streaking then should follow-up for further evaluation and treatment    Cori Barrett MD

## 2019-05-20 PROBLEM — I63.9 CVA (CEREBRAL VASCULAR ACCIDENT) (H): Status: ACTIVE | Noted: 2019-01-01

## 2019-05-20 NOTE — PROVIDER NOTIFICATION
MD paged with positive MRSA results via nares. Charge RN informed. Pt transferred to private room. Pt educated on MRSA. [facility]

## 2019-05-20 NOTE — PLAN OF CARE
PT and OT: Received eval orders.  Pt admitted overnight with CVI, received tPA.  Holding evals this date.

## 2019-05-20 NOTE — ED NOTES
Pt continuing on TPA, no s&s of reaction, tolerating well. No change in neuro status at this time.

## 2019-05-20 NOTE — H&P
[facility]    History and Physical - Hospitalist Service       Date of Admission:  5/19/2019    Assessment & Plan   Pepito Hicks is a 89 year old male with history of hypertension, spina bifida, basal cell carcinoma, osteoarthritis and recent evaluation for lower extremity edema and weeping wounds who presented to the ED his wife with complaints of left-sided weakness and facial droop.  Code stroke was called and he received TPA with some improvement already noted in left extremity motor function. He is admitted to ICU for post-tpa cares.    CVA s/p tpa  - Last known normal somewhere around 8pm; L sided reportedly flaccid initially, now able to lift off bed (145am 5/20).   - Apparently new a fib noted on monitor and EKG, rate controlled  - CT imaging pending, no hemorrhage or proximal vascular blockage warranting thrombectomy  - Stroke neuro following  - SBP <185, DBP <105, prn hydralazine and labetalol available  - No anticoags or NSAIDs for 24 hours  - Telemetry  - HbA1c, lipids, TSH ordered; troponin q8hr  - Echocardiogram ordered  - PT/OT/ST consulted  - Neuro checks  - Follow up imaging deferred to neurology    Atrial fibrillation  - apparently a new diagnosis - rate is controlled currently  - Echo as above  - telemetry    Lower extremity weeping wounds and edema  - ongoing for last week or so, suspect etiology of vascular deficiency and possible heart failure (possibly exacerbated by atrial fib with RVR resulting in tachy induced CM - rates are controlled thus far, however)  - Bilateral CHIN requested  - WOC RN consulted and appreciated    History of HTN  - PTA amlodipine held for now    Diet: NPO for now, heart healthy if passes dysphagia screening  DVT Prophylaxis: Pneumatic Compression Devices, after 24 hours s/p tpa, could switch DVT px if ok with neurology  Kwon Catheter: not present  Code Status: Full code for now -discussed at length with patient and his wife and bedside RN in  ED.  Patient is having a hard time deciding on this at this point and to summarize, he wishes to be full code for now and discuss it further later today in the morning.  I did explain to him that with his advanced age and overall frail condition at this point I would have great concerns with him doing well during and after a potential cardiac arrest.    Disposition Plan   Expected discharge: 2 - 3 days, recommended to TCU/Rehab pending evaluation by therapies.  Entered: Philippe Garrett MD 05/20/2019, 1:34 AM     The patient's care was discussed with the Bedside Nurse, Patient, Patient's Family and ER MD.    Philippe Garrett MD  [facility]    ______________________________________________________________________    Chief Complaint   Left sided weakness, L visual deficit, and facial droop     History is obtained from the patient and his spouse    History of Present Illness   Pepito Hicks is a 89 year old male with history of hypertension, spina bifida, basal cell carcinoma, osteoarthritis and recent evaluation for lower extremity edema and weeping wounds who presented to the ED his wife with complaints of left-sided weakness and facial droop.  They were watching American Idol with his wife from 8 until 10 PM at which point he was unable to arise from the chair.  He was unable to see on the left side and could not lift his left arm nor his leg.  His wife also reports a facial droop left side.  At that time and currently he denies any chest pain/pressure or palpitations, dizziness, lightheadedness, fever or chills.  No alcohol intake reported.  Overall he had been at his baseline prior to these events.  For some time now it seems that he has been getting short of breath more easily especially with exertion.  They deny associated symptoms with this.  Of note he was recently seen in urgent care due to his bilateral leg edema and water blisters.  Otherwise he has been taking his amlodipine for  high blood pressure as directed.     In the ED code stroke was called and he was promptly evaluated by neurology.  The decision to administer TPA was made.  Since that time he noted improvement in left-sided motor function is noted in the upper and lower extremities.  He continues to report left-sided visual deficit.  Noticeable facial droop still present.  Currently he is afebrile hemodynamically stable with normal saturations on room air.  Telemetry and EKG do indicate irregularly irregular rhythm indicative of atrial fibrillation.  They are unaware of any history of arrhythmia.  BMP was unremarkable glucose was 121, CBC also unremarkable.  CT imaging completed as below plan will be to admit to the ICU for standard post TPA cares and further evaluation.    Review of Systems    The 10 point Review of Systems is negative other than noted in the HPI or here.     Past Medical History    I have reviewed this patient's medical history and updated it with pertinent information if needed.   Past Medical History:   Diagnosis Date     Actinic keratosis      Fibromyalgia      Hyperlipidemia      MEDICAL HISTORY OF -     squamous cancer of scalp     Rosacea      Spina bifida without mention of hydrocephalus, unspecified region      Unspecified essential hypertension        Past Surgical History   I have reviewed this patient's surgical history and updated it with pertinent information if needed.  Past Surgical History:   Procedure Laterality Date     ABDOMEN SURGERY       SPINE SURGERY  age 10 days    for spina bifida     SURGICAL HISTORY OF -   1980    hip fracture       Social History   I have reviewed this patient's social history and updated it with pertinent information if needed.  Social History     Tobacco Use     Smoking status: Never Smoker     Smokeless tobacco: Never Used   Substance Use Topics     Alcohol use: Yes     Comment: SOCIAL     Drug use: No       Family History   I have reviewed this patient's family  history and updated it with pertinent information if needed.   No family history on file.    Prior to Admission Medications   Prior to Admission Medications   Prescriptions Last Dose Informant Patient Reported? Taking?   acetaminophen (TYLENOL) 500 MG tablet   No No   Sig: Take 2 tablets (1,000 mg) by mouth 2 times daily as needed for mild pain   amLODIPine (NORVASC) 2.5 MG tablet   No No   Sig: Take 1 tablet (2.5 mg) by mouth daily      Facility-Administered Medications: None     Allergies   Allergies   Allergen Reactions     Clinoril [Sulindac]      Codeine      Dolobid [Diflunisal]      GI side effects      Feldene [Piroxicam]      Influenza Vaccine Live      Became ill     Orudis [Ketoprofen]      Penicillins      Relafen [Nabumetone]      GI upset       Physical Exam   Vital Signs: Temp: 98.4  F (36.9  C) Temp src: Temporal BP: (!) 144/32 Pulse: 97 Heart Rate: 96 Resp: (!) 31 SpO2: 97 % O2 Device: None (Room air)    Weight: 138 lbs 10.71 oz    Gen: NAD, pleasant  HEENT: Normocephalic, EOMI, MMM  Resp: no crackles,  no wheezes, no increased work of resp  CV: S1S2 heard, irreg irreg rhythm, reg rate, pitting pedal edema bilat  Abdo: soft, nontender, nondistended, bowel sounds present  Ext: distal LE cooler to the touch, sensation intact, pedal pulses diminished but present, bilateral water blisters, no heat, tenderness, or erythema  Neuro: AAOx person, place, and situation. Left sided visual cut, L upper and lower ext weak, but improving during exam actually, now able to lift off bed, right side intact. Sensation grossly intact      Data   Data reviewed today: I reviewed all medications, new labs and imaging results over the last 24 hours. I personally reviewed no images or EKG's today.    Recent Labs   Lab 05/19/19  2348   WBC 5.7   HGB 12.6*   *      INR 1.19*      POTASSIUM 3.8   CHLORIDE 107   CO2 31   BUN 28   CR 0.98   ANIONGAP 6   PRATEEK 8.7   *   TROPI 0.121*     Most Recent 3  CBC's:  Recent Labs   Lab Test 05/19/19  2348 07/15/11  1414   WBC 5.7  --    HGB 12.6* 14.2   *  --      --      Most Recent 3 BMP's:  Recent Labs   Lab Test 05/19/19  2348 10/15/18  1542 07/17/17  1304    137 139   POTASSIUM 3.8 4.3 4.1   CHLORIDE 107 102 104   CO2 31 30 30   BUN 28 13 12   CR 0.98 0.86 0.91   ANIONGAP 6 5 5   PRATEEK 8.7 9.7 9.4   * 105* 97     Most Recent 2 LFT's:No lab results found.  Most Recent 3 Troponin's:  Recent Labs   Lab Test 05/19/19 2348   TROPI 0.121*     Most Recent Cholesterol Panel:  Recent Labs   Lab Test 07/01/16  1110   CHOL 169   LDL 76   HDL 78   TRIG 75     Most Recent Hemoglobin A1c:No lab results found.  No results found for this or any previous visit (from the past 24 hour(s)).

## 2019-05-20 NOTE — ED NOTES
Pt is now able to open and close left hand, though not able to lift it. Able to lift the left leg slightly off the bed. Continues with no sensation to extremity. Hospitalist at bedside.

## 2019-05-20 NOTE — PLAN OF CARE
Pt arrived from ED at 0130 following administration of TPA.    N: Pt continues to have left-sided visual neglect and field cuts. Left pronator drift present as well as left arm and leg numbness. Left facial droop and slurred speech present. Pt forgetful, disoriented to time.  CV: Tele shows AF with rates . BPs WDL. 2+ LE edema present.  Pulm: Lung sounds diminished with expiratory wheezes. Prn nebs ordered. O2 sats > 95% on 2L nasal cannula.  GI/: Voiding per urinal. Active bowel sounds. NPO pending speech eval.  Skin: Multiple wounds present. WOC consult ordered.    Plan for echocardiogram today. Pt and wife updated on plan of care, verbalize understanding. Will continue to monitor closely.

## 2019-05-20 NOTE — PROGRESS NOTES
Status post TPA. Q one hour neuros continue till 0100 5/21/19. Next NIHSS due: 2000 5/20/19. Neuro: Significant left sided neglect and field cut. Left sided numbness both upper and lower. Pt is able to move left side to command with delayed response. Strength is moderate. Right sided movement is purposeful. Pt is able to follow commands with right side, movement is spastic. Unable to complete finger to nose or heel chin. Pt is lethargic during most neuro cks, at 1400 pt was agitated and fully alert, needing to use the urinal. Pt is oriented to person and place, date orientation fluctuates.   CV: Afib, BP stable within ordered parameters. Echo results paged to Dr. Song.  Resp: NC at 2lts, intermittent periods of sv shortness of breath and wz ( wife states this has been going on for some time)  GI: BS+, no appetite.  : voiding  Skin: extensive wounds on bilateral legs, bottom, coccyx, and left hand.   Social: Pt's wife Mireya called at updated. Mireya stated that their daughter will be bringing her tomorrow. Updated as to all current findings and ongoing interventions.   Plan: MRI ordered for tonight.  Wound care consult. csccrn

## 2019-05-20 NOTE — PLAN OF CARE
OT/PT: order received. Per discussion with RN, pt not appropriate this AM due to fatigue however may be appropriate this PM or hold until tomorrow.

## 2019-05-20 NOTE — PROGRESS NOTES
Patient care was assumed this morning, patient was seen and examined    Patient is 89-year-old male with past medical history significant for essential hypertension, spina bifida, basal cell carcinoma, osteoarthritis and recently undergoing evaluation for lower extremity edema and weeping wounds who presented to the emergency department last night as his wife noticed left-sided weakness and facial droop.  Code stroke was called and patient received TPA with some improvement noted in left extremity motor function.  Currently patient is monitored in ICU for post TPA cares.    Patient currently remains n.p.o., will be evaluated by speech therapy, his blood pressures are in normal range, continues to have bilateral lower extremity edema, denying any known history of congestive heart failure, was using Norvasc for blood pressure control.  Patient is denying any history of COPD or asthma, currently lives at home with his wife.    --Continue to monitor patient closely on telemetry and ICU.  -- contact precautions sec to history of MRSA  --We will go ahead and start patient on normal saline at 50 cc/h as currently patient is n.p.o.  --Goal for systolic blood pressure to keep below 180 and diastolic blood pressure less than 105, PRN hydralazine and labetalol is available.  --CT scan of the head was reviewed without contrast which did not show any acute ischemia or hemorrhage.  Old left occipital lobe infarct and old left thalamic lacunar infarct was noted.   --CT scan of the head with brain perfusion was done, showing right posterior cerebral artery distribution transit time prolongation with preservation of cerebral blood volumes, suggesting at risk tissue.  --CT angiogram of head and neck showed right posterior cerebral artery occlusion at the P2 segment, background of the scattered atherosclerotic irregularity including high-grade stenosis of left posterior cerebral artery and right anterior cerebral artery segment was  also noticed.  --CT of the neck showed patent cervical vasculature with moderate plaquing at the carotid bifurcation without evidence of flow-limiting stenosis.  --Neurology has evaluated patient, at this time anticoagulation is held in place for 24 hours post TPA.  --I will discuss with urology plan for anticoagulation considering his new onset atrial fibrillation.  --Physical, occupational and speech therapy evaluation  --2D echo of the heart is ordered, will follow up on results, patient does not have message any known history of congestive heart failure and his bilateral lower extremity edema might be secondary to use of Norvasc and combination of hypoalbuminemia.    -- His blood work is also concerning for new onset diabetes , will order low dose sliding scale insulin   --We will continue to follow closely, no charge for today's visit    Addendum : Echocardiogram results were reviewed concerning for moderate to severely reduced LV systolic function with ejection fraction of 30 to 35%, moderate to severe global hypokinesia of left ventricle, decreased right ventricular systolic function, moderate biatrial enlargement, moderate 2+ MR, 1+ AR, mild to moderate valvular aortic stenosis.  Pulmonary hypertension around 35 mmHg and right atrium.  We will go ahead and consult cardiology for new onset acute systolic congestive heart failure.Make patient Full code right now till further discussion with patient and family.     Trudi Song MD, FACP.  [facility] medicine.

## 2019-05-20 NOTE — PROGRESS NOTES
VASCULAR NEUROLOGY ATTENDING ATTESTATION    Admission Summary  Pepito Hicks is an 89 year old male who presents with left VF deficit. CTP showed right occipital perfusion deficit and right P2 occlusion.  He was found to have Afib.    Last 24 hours:  Still with left sided VF deficit.    Impression:   Acute right occipital ischemic stroke due to Afib  Chronic left occipital ischemic stroke     Recommendations:  -MRI brain  -Will determine when best timing to start AC; Can start at least Aspirin after 24 hours post IV-tPA in the meanwhile  -PT/OT    Yan Sandoval MD  Neurology    Please contact the stroke service with any questions: Feel free to call my cell phone    I, Yan Sandoval MD, was present with the medical student who participated in obtaining the history, performing the exam, and in the documentation of the note.  I have verified the history, personally reviewed the vitals, labs, neuroimaging, medications, examined the patient, and led medical decision making.  I agree with the assessment and plan documented in the student's note below.  My relevant summary and youssef findings are documented above.      [facility], [facility]    Vascular Neurology Progress Note    Name: Pepito Hicks  YOB: 1930  MRN: [id number]  Admission Date: 5/19/2019  Date of Service:05/20/2019   Hospital Day: 2                                             24 hour event summary:                                         Pt given tPA at 0032 on 5/20/19. Neuro exams stable with continued left side visual neglect, left sided weakness and sensory deficits. Pt is MRSA positive.     Physical Examination:     /84   Pulse 91   Temp 98.2  F (36.8  C) (Axillary)   Resp 13   Wt 65.1 kg (143 lb 8.3 oz)   SpO2 98%   BMI 23.88 kg/m        Intake/Output Summary (Last 24 hours) at 5/20/2019 1051  Last data filed at 5/20/2019 0800    General Appearance:   No acute distress  Cardiovascular:  Regular rate and rhythm  Lungs: CTAB, no wheezing, rhonci and crackles  Extremities: No clubbing, cyanosis, no edema   Neuro:  Mental Status Exam: awake, alert, oriented to place and date. Speech fluent with dysarthria. Comprehension intact.   Cranial Nerves: eyes bilaterally looking to right. Unable to move them left. Absent facial sensation on left face. Facial strength weak on left side. Shoulder shrug strong on right, weak on left. Tongue midline. Hard of hearing.   Motor:  5/5 strength in RUE. 3/5 strength in LUE. Finger strength weak on left.   Sensory:  Intact to light touch on right arm, legs and face. Absent on left.               Coordination: FNF difficult to assess with pt visual field difficulties.   Gait: deferred      Medications:     Scheduled Meds:  PRN Meds: acetaminophen **OR** acetaminophen, albuterol, hydrALAZINE, labetalol, naloxone, - MEDICATION INSTRUCTIONS -, ondansetron **OR** ondansetron, - MEDICATION INSTRUCTIONS -    Data:     ROUTINE IP LABS (Last six results)  CBC RESULTS:     Recent Labs   Lab Test 05/19/19  2348 07/15/11  1414   WBC 5.7  --    RBC 3.81*  --    HGB 12.6* 14.2   HCT 40.1  --      --      Basic Metabolic Panel:  Recent Labs   Lab Test 05/19/19  2348 10/15/18  1542 07/17/17  1304 07/01/16  1110 07/21/15  0840 01/14/15  0912  10/30/12  1105    137 139 139  --  140  --  140   POTASSIUM 3.8 4.3 4.1 4.1  --  4.4  --  3.9   CHLORIDE 107 102 104 105  --  104  --  102   CO2 31 30 30 29  --  28  --  29   BUN 28 13 12 13  --  16  --  16   CR 0.98 0.86 0.91 0.86 0.89 0.93   < > 0.91   * 105* 97 111*  --  116*  --  96   PRATEEK 8.7 9.7 9.4 9.2  --  9.4  --  9.0    < > = values in this interval not displayed.     Liver panel:  Recent Labs   Lab Test 05/20/19  0636   PROTTOTAL 6.5*   ALBUMIN 3.2*   BILITOTAL 0.7   ALKPHOS 81   AST 34   ALT 36     INR  Recent Labs   Lab Test 05/19/19  2348   INR 1.19*      Lipid Profile:  Recent Labs   Lab Test 05/20/19  0636  07/01/16  1110 01/14/15  0912 10/30/12  1105   CHOL 127 169 173 195   HDL 58 78 94 74   LDL 56 76 63 109   TRIG 63 75 78 62   CHOLHDLRATIO  --   --  1.8 2.7     Thyroid Panel:  Recent Labs   Lab Test 05/20/19  0636   TSH 2.02      Vitamin B12: No lab results found.   Vitamin D:  Recent Labs   Lab Test 01/14/15  0912 09/26/13  1158 10/30/12  1105   VITDT 23* 23* 20*     A1C:   Recent Labs   Lab Test 05/20/19  0636   A1C 6.8*     Troponin I:   Recent Labs   Lab Test 05/20/19  0636 05/19/19  2348   TROPI 0.119* 0.121*     CPK:   Recent Labs   Lab Test 05/20/19  0636        Ammonia: No lab results found.  Most Recent 6 Bacteria Isolates From Any Culture (See EPIC Reports for Culture Details):No lab results found.    Imaging/ workup:     Imaging:  Radiology findings were communicated with the patient and wife who voiced understanding of the findings.     Head CTA  1. Noncontrast head CT demonstrates no hemorrhage, mass/mass effect, or CT evidence of acute infarct.   2. Moderate zone of chronic ischemia left occipital lobe. Small chronic lacunar infarct in the left thalamus.   3. Head CTA demonstrates abrupt vascular cut off right proximal P2 segment PCA with paucity of arborization distal to this.   4. Marked focal narrowing right A3 segment MICHAEL.   5. Moderate to marked narrowing left P3 segment PCA.   6. Intracranial circulation is otherwise normal.     Neck CTA  1. No high-grade stenosis by NASCET criteria.   2. Emphysematous changes and partially visualized somewhat linear soft tissue in the of left lateral lung on the first couple slices of the exam. Consider further evaluation with chest imaging or comparison to prior studies if available.      Perfusion CT  1. Moderate zone of abnormal perfusion right PCA distrubution as detailed above.     TTE  1. Left ventricular systolic function is moderate to severely reduced. The visual ejection fraction is estimated at 30-35%. There is mod-severe global hypokinesia of  the left ventricle.  2. The right ventricle is mildly dilated.Mildly decreased right ventricular  systolic function.  3. Moderate bi-atrial enlargement.   4. There is moderate (2+) mitral regurgitation.There is mild (1+) aortic  regurgitation.  5. Mild to moderate valvular aortic stenosis. MAUREEN 1.4 cm2. Aortic stenosis evaluation may not be accurate (MAUREEN may be underestimated) due to low LVEF. Visually appears mild to moderate aortic valve stenosis.   6. A contrast injection (Bubble Study) was performed that was negative for flow across the interatrial septum.        Assessment and Plan:                                             Acute Ischemic Stroke (post tPA) Plan  - tPA administered 0032 5/20/19  - new A fib noted on monitoring and EKG, rate controlled  - Labetalol PRN to maintain BP < 180/105  - Euthermia, Euglycemia  - Head of bed elevated  - Hold aspirin and pharmacologic DVT prophylaxis for at least 24 hrs post-tPA  - Statin  - Repeat HCT 24 hrs post-tPA  - TTE with Bubble Study completed  - Telemetry, EKG   - Bedside Glucose Monitoring  - A1c 6.8  Lipid Panel wnl. Troponin x 3 - .121, .119  - neuro checks  - PT/OT/SLP  - PM&R  - Stroke Education      Case was seen and discussed with Dr. Lori MD.     Laura Orosco, MS4  May 20, 2019

## 2019-05-20 NOTE — PROGRESS NOTES
ICU Multi-Disciplinary Note  Patient condition reviewed and discussed while on multidisciplinary rounds today. Mr. Hicks is a 89 year old male admitted with ischemic stroke s/p tPA administration. He was admitted to the ICU for frequent neurologic monitoring. He is currently hemodynamically stable and oxygenating well on 4L nasal cannula. No interventions were implemented by the ICU team.   The Critical Care service will continue to follow peripherally while the patient is within the ICU. We are readily available should issues arise. Please feel free to contact us for critical care issues with which we may be of assistance. For all other concerns, please contact primary service first.   CAROLA Jimenez

## 2019-05-20 NOTE — PROGRESS NOTES
[facility] Nurse Inpatient Wound Assessment     Initial Assessment of wound(s) on pt's:   BL IT, BL Lower extremites, L hand        Data:   Patient History:      per MD note(s): Pepito Hicks is a 89 year old male with history of hypertension, spina bifida, basal cell carcinoma, osteoarthritis and recent evaluation for lower extremity edema and weeping wounds who presented to the ED his wife with complaints of left-sided weakness and facial droop.  Code stroke was called and he received TPA with some improvement already noted in left extremity motor function. He is admitted to ICU for post-tpa cares.        Daniel Risk Assessment  Sensory Perception: 2-->very limited    Moisture: 3-->occasionally moist   Activity: 1-->bedfast     Mobility: 2-->very limited   Nutrition: 2-->probably inadequate   Daniel Score: 11    Positioning: Pillows    Mattress:  Standard , Low air loss mattress    Moisture Management:  Incontinence Protocol and Diaper    Catheter secured? Not applicable    Current Diet / Nutrition:       None        Labs:   Recent Labs   Lab Test 05/20/19  0636 05/19/19  2348   ALBUMIN 3.2*  --    HGB  --  12.6*   INR  --  1.19*   WBC  --  5.7   A1C 6.8*  --        Wound Assessment (location):   BL Lower extremities  Wound History:  Per spouse pt has been having increased weakness and lower extremity edema with blisters   Right upper shin: 2.5cm x 2cm roofed blister filled with serous fluid  Right lower shin 7cm x 5cm  roofed blister filled with serous fluid  Right Posterior Calf 7cm x 7cm x 0.1 unroofed blister with pink moist dermis draining mod amt serous drainage  Left shin 6.1cm x 6.6cm x 0.1 unroofed blister with pink moist dermis draining mod amt serous drainage  Left Calf 1.8cm x 2.7cm x 0.1 unroofed blister with pink moist dermis draining mod amt serous drainage    Wound Assessment (location):   Left hand  Wound History:  Pt with left side weakness and hand bumped causing  skin tear   Partial ecchymotic epidermal flap able to be approximated 100%, purple maroon erythema, draining scant serosanguinous.     Wound Assessment (location):   BL IT  Wound History: Unknown history of wounds, present on admission    Wound Base: 100% adherent slough     Specific Dimensions (length x width x depth, in cm) :   Right IT 0.6cm x 0.9cm x 0.2cm                                                                                            Left IT proximal 1.0cm x 1.2cm x 0.2cm, distal 0.5cm x 0.5cm x 0.2cm    Tunneling:  N/A    Undermining: N/A    Palpation of the wound bed:  fluctuance    Slough appearance:  adherent and stringy    Eschar appearance:  none    Periwound Skin: intact,      Color: normal and consistent with surrounding tissue    Temperature  normal     Drainage:  Creamy tan     Odor: none    Pain:  absent           Intervention:     Patient's chart evaluated.      Wound(s) assessed    Wound Care: BL lower extremities and hand-vaseline guaze and absorbent dressing, mepilex to BL IT    Orders  Written    Supplies  placed at the bedside    Discussed plan of care with Nurse          Assessment:          Pt has roofed and unroofed blisters to BL lower extremities caused by increased edema. No acute sings or infection         Pt has unstageable pressure injury to BL IT, will add topical Iodosorb to help clean up these wounds. Wounds were present on admission.  No acute signs or symptoms of infection        Left hand skin tear caused by trauma. No acute signs or symptoms of infection        Plan:     Nursing to notify the Provider(s) and re-consult the [facility] Nurse if wound(s) deteriorate(s) or if the wound care plan needs reevaluation.    Plan of care for wound located on BL lower extremities:     1. Cleanse with Microklenz and blot dry    2. Apply vaseline gauze to wound bed    3. Cover with absorbent dressing    4. Secure with gauze and tape    5. Time/Date/Initial dressing change      Plan of  "care for wound located on: Left hand    1. Cleanse with microklenz and blot dry    2. Apply vaseline guaze to wound bed    3. Cover with  Border Dressing (#[id number])     4. Label dressing to show which way to pull off to maintain skin flap integrity.     5. Time/Date/Initial Dressing change      Plan of care for wound located on: BL IT and Coccyx  1. Clean wound with saline or MicKlenz Spray, pat dry  2. Paint with No Sting Skin Prep (#[id number])) and allow to dry thoroughly  3. Apply small dab of Iodosorb Gel (#[id number]) to wound bed only of BL IT  4. Press a Mepilex  Border Dressing (#[id number]) to BL IT and  Sacral Dressing ([id number]) to coccyx for prevention, making sure to conform nicely to skin curvatures   (begin placing the Mepilex at the most distal aspect first, smooth into place in an upward direction, then smooth side to side)   5. Time and date dressing change and kyle with a \"T\" for treatment of a wound  6. Reposition pt every 1 to 2 hours when in bed and hourly when up to the chair to relieve pressure and promote perfusion to tissue  NOTE:  Iodosorb Gel should not be used longer than 14 days. After 14 days the gel should be stopped and a new plan established, this may mean only a simple, gauze dressing.  The Iodosorb Gel should be stopped after use on Shraddha 3 .             [facility] Nurse will return: Weekly        "

## 2019-05-20 NOTE — CONSULTS
[facility]    Cardiology Consultation     Date of Admission:  5/19/2019    Primary Care Physician   Lucio Jennings     Consult Date:  05/20/2019      REASON FOR CONSULTATION:  New congestive heart failure.      REFERRING PHYSICIAN:  Dr. Song, Hospitalist Service.      IMPRESSION:   1.  Cardiomyopathy with moderate-to-severe left ventricular systolic dysfunction.  More than likely ischemic in origin.   2.  Mild to moderate aortic stenosis.   3.  Moderate mitral regurgitation.   4.  Pulmonary hypertension, likely secondary.   5.  Acute right posterior cerebral infarct, status post TPA administration.  A CT scan also shows old left occipital and old left thalamic infarct.   6.  Peripheral arterial disease.  Cold right leg with ischemic ulceration bilaterally.   7.  Atrial fibrillation, uncertain duration.   8.  Hypertension, under reasonable control.   9.  Diabetes mellitus.  HbA1c 6.8.      This is a frail 89-year-old gentleman who presented acutely with a right posterior cerebral infarct.  Though he denies any symptoms of ischemic heart disease or congestive heart failure, I think it is quite likely that he has ischemic cardiomyopathy, particularly as he has evidence of severe atherosclerotic disease in other vascular beds, and he has diabetes and hypertension.  The valvular lesions are not likely of sufficient severity to result in cardiomyopathy.  He is a very frail individual.  We should certainly start treatment for his cardiomyopathy.  He is n.p.o. at this time and I would like to start him off on a low dose of intravenous metoprolol. Though he appears to have an excellent lipid profile, a statin should also be started when he is no longer NPO.     His CHADS2-VASc score is extremely high and he would be a candidate for long-term oral anticoagulation.  This can be started whenever considered suitable by Neurology.      I don't think he has CHF at this time.  I will request a NT proBNP to  serve as a baseline.     If sufficient neurologic recovery occurs, stress testing may be performed to assess ischemic burden.  Unless large areas of ischemic myocardium are found, I would be in favor for conservative treatment.      It will be my pleasure to follow this gentleman's in-hospital course with you as necessary.      HISTORY OF PRESENT ILLNESS:  A pleasant 89-year-old gentleman who denies any previous cardiac history.  He was admitted the early hours of this morning with acute cerebral infarct.  Cerebral angiography demonstrated acute occlusion of the posterior cerebral artery.  CT scan also demonstrated an old left occipital and old left thalamic infarcts.      He tells me that he does not have chest pains or shortness of breath.  He also denies PND, orthopnea or ankle swelling.  He tells me that he was admitted because he felt a little weak.  On reading his excellent admission notes, it appears that he presented with acute left-sided weakness.      Other past medical history of note included spina bifida and osteoarthritis.     Past Medical History   I have reviewed this patient's medical history and updated it with pertinent information if needed.   Past Medical History:   Diagnosis Date     Actinic keratosis      Fibromyalgia      Hyperlipidemia      MEDICAL HISTORY OF -     squamous cancer of scalp     Rosacea      Spina bifida without mention of hydrocephalus, unspecified region      Unspecified essential hypertension        Past Surgical History   I have reviewed this patient's surgical history and updated it with pertinent information if needed.  Past Surgical History:   Procedure Laterality Date     ABDOMEN SURGERY       SPINE SURGERY  age 10 days    for spina bifida     SURGICAL HISTORY OF -   1980    hip fracture       Prior to Admission Medications   Prior to Admission Medications   Prescriptions Last Dose Informant Patient Reported? Taking?   acetaminophen (TYLENOL) 500 MG tablet   Spouse/Significant Other No Yes   Sig: Take 2 tablets (1,000 mg) by mouth 2 times daily as needed for mild pain   amLODIPine (NORVASC) 2.5 MG tablet  Spouse/Significant Other No Yes   Sig: Take 1 tablet (2.5 mg) by mouth daily      Facility-Administered Medications: None     Allergies   Allergies   Allergen Reactions     Clinoril [Sulindac]      Codeine      Dolobid [Diflunisal]      GI side effects      Feldene [Piroxicam]      Influenza Vaccine Live      Became ill     Orudis [Ketoprofen]      Penicillins      Relafen [Nabumetone]      GI upset       Social History   I have reviewed this patient's social history and updated it with pertinent information if needed. Pepito Hicks  reports that he has never smoked. He has never used smokeless tobacco. He reports that he drinks alcohol. He reports that he does not use drugs.    Family History   I have reviewed this patient's family history and updated it with pertinent information if needed.   No family history on file.    Review of Systems   The 10 point Review of Systems is negative other than noted in the HPI or here.     Physical Exam   Temp: 98.4  F (36.9  C) Temp src: Axillary BP: 128/77 Pulse: 92 Heart Rate: 96 Resp: 20 SpO2: 96 % O2 Device: Nasal cannula Oxygen Delivery: 2 LPM  Vital Signs with Ranges  Temp:  [98  F (36.7  C)-98.7  F (37.1  C)] 98.4  F (36.9  C)  Pulse:  [] 92  Heart Rate:  [] 96  Resp:  [7-24] 20  BP: (107-158)/(62-99) 128/77  SpO2:  [92 %-100 %] 96 %  141 lbs 1.51 oz     PHYSICAL EXAMINATION:   GENERAL:  A frail looking elderly gentleman in no acute distress.   VITAL SIGNS:  Heart rate is between 70-90, atrial fibrillation.  He is afebrile.  Blood pressure 134/84.   HEENT:  Mucous membranes appear pale.  No clubbing, no peripheral or central cyanosis.   NECK:  No raised JVP and no thyromegaly.   CARDIAC:  Cardiac apex is not palpable.  Auscultation reveals a soft systolic murmur at the cardiac apex.  Second heart sound  appears reasonably well preserved.   CHEST:  Symmetrical expansion without the use of accessory muscles.  Breath sounds are quiet but normal.   ABDOMEN:  Soft and nontender.  No hepatosplenomegaly.  The abdominal aorta is not palpable.   EXTREMITIES:  Foot pulses are severely diminished on the right.  Right foot feels cold.  Left-sided foot pulses are mildly diminished.  He has 1+ bilateral ankle edema.   CENTRAL NERVOUS SYSTEM:  Left hemiplegia.  Appears alert and oriented.      LABORATORY DATA:  The echocardiographic findings are as noted above.      EKG shows atrial fibrillation, delayed R-wave progression, left anterior hemiblock.  There are no acute changes.        Basic metabolic panel within normal limits.  HDL is 58 with LDL of 56.  Troponin 0.12, 0.119.  TSH is 2.02.  Hemoglobin is 12.6, platelet count of 208 and WBC count of 5.7.        Data   Results for orders placed or performed during the hospital encounter of 19 (from the past 24 hour(s))   Echocardiogram Complete - Bubble study    Narrative    [id number]  [id number]  [id number]  [id number]^KATHY^SANDRINE^CLARA           [facility]  Echocardiography Laboratory  [address] [place] [address]        Name: ISAAC SALES  MRN: [id number]  : 1930  Study Date: 2019 08:26 AM  Age: 89 yrs  Gender: Male  Patient Location: [facility]  Reason For Study: CVA  Ordering Physician: SANDRINE CHAVEZ  Referring Physician: Lucio Jennings  Performed By: Terrence Sandoval RDCS     BSA: 1.7 m2  Height: 65 in  Weight: 138 lb  HR: 75  BP: 144/32 mmHg  _____________________________________________________________________________  __        Procedure  Complete Portable Bubble Echo Adult.  _____________________________________________________________________________  __        Interpretation Summary     Left ventricular systolic function is moderate to severely reduced.The visual  ejection fraction is estimated at 30-35%.There is  mod-severe global  hypokinesia of the left ventricle.  The right ventricle is mildly dilated.Mildly decreased right ventricular  systolic function.  Moderate bi-atrial enlargement.  There is moderate (2+) mitral regurgitation.There is mild (1+) aortic  regurgitation.  Mild to moderate valvular aortic stenosis. MAUREEN 1.4 cm2. Aortic stenosis  evaluation may not be accurate (MAUREEN may be underestimated) due to low LVEF.  Visually appears mild to moderate aortic valve stenosis.  A contrast injection (Bubble Study) was performed that was negative for flow  across the interatrial septum.  Pulmonary hypertension- RVSP 35 mm hg +RA.  Dilation of the inferior vena cava is present with abnormal respiratory  variation in diameter.     No prior study for comparision.        _____________________________________________________________________________  __        Left Ventricle  The left ventricle is normal in size. There is normal left ventricular wall  thickness. Left ventricular systolic function is moderate to severely reduced.  The visual ejection fraction is estimated at 30-35%. Diastolic function not  assessed due to arrhythmia. There is mod-severe global hypokinesia of the left  ventricle.     Right Ventricle  The right ventricle is mildly dilated. Mildly decreased right ventricular  systolic function.     Atria  The left atrium is moderately dilated. The right atrium is moderately dilated.  There is no color Doppler evidence of an atrial shunt. A contrast injection  (Bubble Study) was performed that was negative for flow across the interatrial  septum.     Mitral Valve  There is mild mitral annular calcification. The mitral valve leaflets appear  thickened, but open well. There is moderate (2+) mitral regurgitation.        Tricuspid Valve  There is mild (1+) tricuspid regurgitation. The right ventricular systolic  pressure is approximated at 35.4 mmHg plus the right atrial pressure.  Pulmonary hypertension.     Aortic  Valve  The aortic valve is not well visualized. There is mild (1+) aortic  regurgitation. Mild to moderate valvular aortic stenosis.     Pulmonic Valve  The pulmonic valve is not well visualized. There is no pulmonic valvular  stenosis.     Vessels  The aortic root is normal size. The ascending aorta is Borderline dilated. 3.7  cm. Dilation of the inferior vena cava is present with abnormal respiratory  variation in diameter.     Pericardium  There is no pericardial effusion.        Rhythm  The rhythm was atrial fibrillation.  _____________________________________________________________________________  __  MMode/2D Measurements & Calculations  IVSd: 1.1 cm     LVIDd: 4.9 cm  LVIDs: 4.2 cm  LVPWd: 1.1 cm  FS: 13.5 %  LV mass(C)d: 201.8 grams  LV mass(C)dI: 119.4 grams/m2  Ao root diam: 3.6 cm  LA dimension: 4.3 cm  asc Aorta Diam: 3.7 cm  LA/Ao: 1.2  LVOT diam: 2.0 cm  LVOT area: 3.1 cm2  LA Volume (BP): 71.5 ml  LA Volume Index (BP): 42.3 ml/m2  RWT: 0.46           Doppler Measurements & Calculations  Ao V2 max: 172.2 cm/sec  Ao max P.0 mmHg  Ao V2 mean: 118.0 cm/sec  Ao mean P.4 mmHg  Ao V2 VTI: 30.2 cm  MAUREEN(I,D): 1.3 cm2  MAUREEN(V,D): 1.4 cm2  LV V1 max P.6 mmHg  LV V1 max: 79.9 cm/sec  LV V1 VTI: 12.9 cm  SV(LVOT): 39.8 ml  SI(LVOT): 23.6 ml/m2  TR max blane: 289.7 cm/sec  TR max P.4 mmHg  AV Blane Ratio (DI): 0.46  MAUREEN Index (cm2/m2): 0.78           _____________________________________________________________________________  __           Report approved by: Indy Nesbitt 2019 09:21 AM      US CHIN Doppler No Exercise    Narrative    ULTRASOUND ANKLE-BRACHIAL INDEX DOPPLER NO EXERCISE, 1-2 LEVELS,  BILATERAL  2019 9:51 AM     HISTORY: Adm with stroke status post TPA. Bilateral leg wounds, edema.    COMPARISON: None.    FINDINGS:  Right CHIN: Cannot be calculated secondary to noncompressible vessels.  Left CHIN: 1.19.  Right first digital pressure 0.54  Left first digital pressure  0.52    Waveforms: Triphasic on the left. Triphasic on the right posterior  tibial and biphasic in the right dorsalis pedis. Normal digital  waveforms bilaterally.      Impression    IMPRESSION:   1. Right CHIN could not be calculated secondary to noncompressible  vessels.  2. Left ABIs normal without evidence of arterial insufficiency.  3. Abnormal digital pressures bilaterally, however waveforms are  normal bilaterally.     CHIN CRITERIA:  >0.95 Normal  0.90 - 0.94 Mild  0.5 - 0.89 Moderate  0.2 - 0.49 Severe  <0.2 Critical    SNEHA ANDERSEN DO   Glucose by meter   Result Value Ref Range    Glucose 99 70 - 99 mg/dL   Troponin I   Result Value Ref Range    Troponin I ES 0.095 (H) 0.000 - 0.045 ug/L   Glucose by meter   Result Value Ref Range    Glucose 89 70 - 99 mg/dL   Glucose by meter   Result Value Ref Range    Glucose 102 (H) 70 - 99 mg/dL   Glucose by meter   Result Value Ref Range    Glucose 99 70 - 99 mg/dL   Glucose by meter   Result Value Ref Range    Glucose 99 70 - 99 mg/dL   N-terminal Pro BNP Inpatient (AM Draw)   Result Value Ref Range    N-Terminal Pro BNP Inpatient 9,323 (H) 0 - 1,800 pg/mL               AURORA VANN MD, [facility]             D: 2019   T: 2019   MT: WT      Name:     ISAAC SALES   MRN:      [id number]        Account:       [id number]   :      1930           Consult Date:  2019      Document: [id number]       cc: CHANDRIKA Jennings MD

## 2019-05-20 NOTE — PLAN OF CARE
Discharge Planner SLP   Patient plan for discharge: Did not state  Current status: A bedside swallow evaluation was completed this pm.  Patient presents with moderate-severe oral-pharyngeal dysphagia and high aspiration risk for all po intake at present.  RR rate varied from 9-33 impacting swallow and breathing coordination.  Deficits also included decreased attention/awareness, oral motor deficits greater on left, dysarthria, poor voicing, weak cough on command, and delayed swallow to ice chip trial with discomfort noted.  Unable to rule out silent aspiration at bedside.  Recommend NPO status and frequent oral cares.  Recommend consideration of alternative nutrition/hydration if deficits/risk factors do not improve.  SLP to re-assess function in swallow Tx daily as able.  Barriers to return to prior living situation: Level of assist, confusion  Recommendations for discharge: TCU; continued SLP services  Rationale for recommendations: SLP swallow Tx to maximize function for safe po intake, SLP speech Tx to maximize communication for daily needs       Entered by: Cathy Barnett 05/20/2019 2:03 PM

## 2019-05-20 NOTE — ED NOTES
Bed: ST  Expected date: 5/19/19  Expected time: 11:40 PM  Means of arrival: Ambulance  Comments:  Zechariah 533 89M stroke

## 2019-05-20 NOTE — CONSULTS
[facility]      Stroke Consult Note    Reason for Consult:  Stroke Code    HPI  Pepito Hicks is a 89 year old male with HL, spina bifida, chronic pain (controlled with Tylenol) who presents with left sided weakness and confusion.  History is difficult to obtain from his wife, but they began watching TV at 2000 and when he tried to get up at 2200, he was unable to do so.  She does not believe he was weak before then and he was otherwise able to watch the show and seemed to be mentating normally.  I reviewed the risk-benefit of IV tPA with his wife and also the the contraindications.    On imaging, Head CT shows encephalomalacia from remote infarcts, CTA shows intracranial athero with proximal R. P2 occlusion, CTP shows matched perfusion defiicit involving the entire PCA territory including the thalamus.    TPA Treatment   Administered. Risks and benefits of tPA were discussed with Patient and Family prior to administration.  --IV tPA was delayed due to needing to reconfirm onset time and contraindications with the wife, who was not intiially available to the Telestroke neurologist.    Endovascular Treatment  Not initiated due to absence of proximal vessel occlusion    Impression  Ischemic Stroke due to embolic stroke of undetermined source (ESUS)    Recommendations  1. Admit for post-tPA care and stroke evaluation    Patient Follow-up    - final recommendation pending work-up      Please contact the Stroke Service with any questions.    Benjy Verde MD, MS  Vascular Neurology    Text Page ([phone])  __________________________________________________    Past Medical History:   Diagnosis Date     Actinic keratosis      Fibromyalgia      Hyperlipidemia      MEDICAL HISTORY OF -     squamous cancer of scalp     Rosacea      Spina bifida without mention of hydrocephalus, unspecified region      Unspecified essential hypertension        Past Surgical History:   Procedure Laterality Date      ABDOMEN SURGERY       SPINE SURGERY  age 10 days    for spina bifida     SURGICAL HISTORY OF -   1980    hip fracture       Medications   Current Facility-Administered Medications   Medication     alteplase (ACTIVASE) infusion 50.95 mg    Followed by     0.9% sodium chloride BOLUS     Current Outpatient Medications   Medication Sig     acetaminophen (TYLENOL) 500 MG tablet Take 2 tablets (1,000 mg) by mouth 2 times daily as needed for mild pain     amLODIPine (NORVASC) 2.5 MG tablet Take 1 tablet (2.5 mg) by mouth daily         (Not in a hospital admission)    Allergies   Allergies   Allergen Reactions     Clinoril [Sulindac]      Codeine      Dolobid [Diflunisal]      GI side effects      Feldene [Piroxicam]      Influenza Vaccine Live      Became ill     Orudis [Ketoprofen]      Penicillins      Relafen [Nabumetone]      GI upset       Family History       Social History   Social History     Socioeconomic History     Marital status:      Spouse name: Not on file     Number of children: Not on file     Years of education: Not on file     Highest education level: Not on file   Occupational History     Not on file   Social Needs     Financial resource strain: Not on file     Food insecurity:     Worry: Not on file     Inability: Not on file     Transportation needs:     Medical: Not on file     Non-medical: Not on file   Tobacco Use     Smoking status: Never Smoker     Smokeless tobacco: Never Used   Substance and Sexual Activity     Alcohol use: Yes     Comment: SOCIAL     Drug use: No     Sexual activity: Not Currently   Lifestyle     Physical activity:     Days per week: Not on file     Minutes per session: Not on file     Stress: Not on file   Relationships     Social connections:     Talks on phone: Not on file     Gets together: Not on file     Attends Orthodox service: Not on file     Active member of club or organization: Not on file     Attends meetings of clubs or organizations: Not on file      Relationship status: Not on file     Intimate partner violence:     Fear of current or ex partner: Not on file     Emotionally abused: Not on file     Physically abused: Not on file     Forced sexual activity: Not on file   Other Topics Concern     Parent/sibling w/ CABG, MI or angioplasty before 65F 55M? Not Asked   Social History Narrative     Not on file          ROS  The 10 point Review of Systems is negative other than noted in the HPI or here.     PHYSICAL EXAMINATION  B/P:     (!) 113/95 (05/20/19 0030)    Heart Rate: 101 (05/20/19 0039)    Pulse: 102 (05/20/19 0030)   Resp:  18 (05/20/19 0039)  Temp: 98.4  F (36.9  C)   Temporal (05/19/19 2346)      Neuro:  Mental status: Awake, alert, reasonably attentive, oriented x3. Speech is fluent with dysarthria.  Comprehension intact. Poor historian with asomatognosia and anosognosia.    Cranial nerves: EOMI, visual fields full, face symmetric, facial sensation intact, shoulder shrug strong, tongue/uvula midline, hard of hearing.  Motor: 5/5 strength in RUE, 3/5 strength with drift RLE and LUE, 2/5 strength LLE  Sensory (assessed via nursing): Intact to light touch in face, arms, and legs on right, absent on left  Coordination: Finger to nose intact bilaterally without dysmetria.  Normal heel-shin test bilaterally  Gait: Deferred    NIHSS =15      Labs/Imaging  Labs and imaging were reviewed and used in developing plan; pertinent results included.  Data   CBC  WBC (10e9/L)   Date Value   05/19/2019 5.7   10/23/2007 4.4   10/24/2006 3.8 (L)    RBC Count (10e12/L)   Date Value   05/19/2019 3.81 (L)   10/23/2007 4.51   10/24/2006 4.36 (L)    Hemoglobin (g/dL)   Date Value   05/19/2019 12.6 (L)   07/15/2011 14.2   10/23/2007 14.9      Hematocrit (%)   Date Value   05/19/2019 40.1   10/23/2007 43.9   10/24/2006 41.2    Platelet Count (10e9/L)   Date Value   05/19/2019 208   10/23/2007 244   10/24/2006 241         BMP  Sodium (mmol/L)   Date Value   05/19/2019 144    10/15/2018 137   07/17/2017 139    Potassium (mmol/L)   Date Value   05/19/2019 3.8   10/15/2018 4.3   07/17/2017 4.1    Chloride (mmol/L)   Date Value   05/19/2019 107   10/15/2018 102   07/17/2017 104      Carbon Dioxide (mmol/L)   Date Value   05/19/2019 31   10/15/2018 30   07/17/2017 30    Glucose (mg/dL)   Date Value   05/19/2019 121 (H)   10/15/2018 105 (H)   07/17/2017 97    Urea Nitrogen (mg/dL)   Date Value   05/19/2019 28   10/15/2018 13   07/17/2017 12      Creatinine (mg/dL)   Date Value   05/19/2019 0.98   10/15/2018 0.86   07/17/2017 0.91    Calcium (mg/dL)   Date Value   05/19/2019 8.7   10/15/2018 9.7   07/17/2017 9.4         INR Troponin I A1C   INR (no units)   Date Value   05/19/2019 1.19 (H)    Troponin I ES (ug/L)   Date Value   05/19/2019 0.121 (HH)    No results found for: A1C     Liver Panel  No results found for: PROTTOTAL No results found for: ALBUMIN No results found for: BILITOTAL   No results found for: ALKPHOS AST (U/L)   Date Value   01/30/2008 35   12/06/2007 38    ALT (U/L)   Date Value   12/06/2007 21      No results found for: BILIDIRECT       Lipid Profile  Cholesterol (mg/dL)   Date Value   07/01/2016 169   01/14/2015 173   10/30/2012 195    HDL Cholesterol (mg/dL)   Date Value   07/01/2016 78   01/14/2015 94   10/30/2012 74    LDL Cholesterol Calculated (mg/dL)   Date Value   07/01/2016 76   01/14/2015 63   10/30/2012 109      Triglycerides (mg/dL)   Date Value   07/01/2016 75   01/14/2015 78   10/30/2012 62    Cholesterol/HDL Ratio (no units)   Date Value   01/14/2015 1.8   10/30/2012 2.7   10/21/2008 3.1              I have personally spent a total of 80 minutes providing critical care services supervising this patient's stroke code activation.  I personally reviewed all lab values and radiology images.  I evaluated and directed care for the patient's critical condition.     Stroke Code Data  (for stroke code with tele)  Stroke code activated 05/19/19   2341   First stroke  provider response 05/20/19   2347   Video start time 05/20/19   0005   Video end time 05/20/19   0027   Last known normal 05/19/19 2200   Time of discovery  (or onset of symptoms)  05/19/19 2200   Head CT read by me 05/20/19   0000   Was stroke code de-escalated? No               Telestroke Service Details  Type of service telemedicine diagnostic assessment of acute neurological changes   Reason telemedicine is appropriate patient requires assessment with a specialist for diagnosis and treatment of neurological symptoms   Mode of transmission secure interactive audio and video communication per [place]   Originating site (patient location) [facility]    Distant site (provider location) [facility]

## 2019-05-20 NOTE — ED TRIAGE NOTES
Pt had fall yesterday, seen by EMS but not transported. Tonight at 2250 pt had trouble standing, left side weakness/arm drop, left  sided facial droop and slurred speech as well as left visual neglect.

## 2019-05-20 NOTE — ED PROVIDER NOTES
"  History     Chief Complaint:  Left sided neglect    The history is provided by the patient, the spouse and the EMS personnel.      Pepito Hicks is a 89 year old male who presents with left sided neglect. EMS states the patient had a fall yesterday and was evaluated by EMS but did not transport.  EMS states at 2250 when they arrived the patient was unable to stand with left sided facial droop. EMS notes a blood sugar 117. He was originally sating at 90% in room air, and his last blood pressure was 144 systolic.     The wife notes the patient was sitting watching TV for several hours beginning around 2000. She states she did not pay a lot of attention to him during the program and when the program ended at 2200 he couldn't get out of his chair. She notes left arm weakness when he tried to move the walker closer to him as he was only able to use his right arm. She states because of this he was unable to support himself to stand. The patient notes he feels \"terrible\" as he cannot move. The patient denies recent alcohol use.     Allergies:  Clinoril  Codeine  Dolabid  Feldene  Influenza Vaccine Live  Orudis  Penicillins  Relafen      Medications:    Tylenol  Amlodipine    Past Medical History:    Basal cell carcinoma  Actinic keratosis  Fibromyalgia  Hyperlipidemia  Rosacea  Spina bifida without mention of hydrocephalus  Hypertension  Osteoarthritis     Past Surgical History:    Abdomen surgery   Spine surgery for spina bifida  Hip fracture repair    Family History:    No past pertinent family history.    Social History:  The patient was accompanied to the ED by wife.  Smoking Status: Never Smoker  Smokeless Tobacco: Never Used  Alcohol Use: Positive  Marital Status:        Review of Systems   Constitutional: Negative for fever.   Respiratory: Negative for shortness of breath.    Cardiovascular: Negative for chest pain.   Gastrointestinal: Negative for abdominal pain and vomiting.   Neurological: " Positive for facial asymmetry, weakness and numbness.        Left sided neglect    All other systems reviewed and are negative.    Physical Exam     Patient Vitals for the past 24 hrs:   BP Temp Temp src Pulse Heart Rate Resp SpO2 Weight   05/20/19 0100 124/83 -- -- -- 93 12 97 % --   05/20/19 0059 -- -- -- -- 89 12 96 % --   05/20/19 0058 -- -- -- -- 93 13 98 % --   05/20/19 0050 127/82 -- -- 94 97 10 97 % --   05/20/19 0039 -- -- -- -- 101 18 98 % --   05/20/19 0030 (!) 113/95 -- -- 102 108 (!) 60 98 % --   05/20/19 0022 (!) 161/98 -- -- 111 102 12 100 % --   05/20/19 0000 (!) 150/99 -- -- 108 -- -- 99 % --   05/19/19 2347 -- -- -- -- 103 17 -- --   05/19/19 2346 166/88 98.4  F (36.9  C) Temporal -- 99 -- 92 % 62.9 kg (138 lb 10.7 oz)     Physical Exam  General: Appears well-developed and well-nourished.   Head: No signs of trauma.   Mouth/Throat: Oropharynx is clear and moist.   Eyes: Conjunctivae are normal. Pupils are equal, round, and reactive to light. Does not look to the left.  Neck: Normal range of motion. No nuchal rigidity. No cervical adenopathy  CV: Normal rate and regular rhythm.    Resp: Effort normal and breath sounds normal. No respiratory distress.   GI: Soft. There is no tenderness.  No rebound or guarding.  Normal bowel sounds.  No CVA tenderness.  MSK: Normal range of motion. no edema. No Calf tenderness.  Neuro: The patient is alert and oriented to person, place, and time.  Minimal contraction of muscle and movement of left arm and leg.  Left sided facial droop.  Normal strength to right side.  Numbness to left side and neglecting left side.    Skin: Skin is warm and dry.   Psych: normal mood and affect. behavior is normal.     National Institutes of [employer] Stroke Scale  Exam Interval: Baseline   Score    Level of consciousness: (0)   Alert, keenly responsive    LOC questions: (0)   Answers both questions correctly    LOC commands: (0)   Performs both tasks correctly    Best gaze: (1)    Partial gaze palsy    Visual: (1)   Partial hemianopia    Facial palsy: (2)   Partial paralysis (total/near total of lower face)    Motor arm (left): (3)   No effort against gravity    Motor arm (right): (0)   No drift    Motor leg (left): (4)   No movement    Motor leg (right): (0)   No drift    Limb ataxia: (0)   Absent    Sensory: (2)   Severe to total sensory loss    Best language: (0)   Normal- no aphasia    Dysarthria: (1)   Mild to moderate dysarthria    Extinction and inattention: (0)   No abnormality        Total Score:  14     Emergency Department Course     ECG:  ECG taken at 0035, ECG read at 0038  Atrial fibrillation  Left anterior fascicular block  Minimal voltage criteria for LVH, may be normal variant  Abnormal ECG  Rate 97 bpm. NC interval * ms. QRS duration 114 ms. QT/QTc 368/467 ms. P-R-T axes * -66 97.  Atrial fibrillation is new since previous EKG on 07/15/11    Imaging:  Radiology findings were communicated with the patient and wife who voiced understanding of the findings.    Head CTA  1. Noncontrast head CT demonstrates no hemorrhage, mass/mass effect, or CT evidence of acute infarct.   2. Moderate zone of chronic ischemia left occipital lobe. Small chronic lacunar infarct in the left thalamus.   3. Head CTA demonstrates abrupt vascular cut off right proximal P2 segment PCA with paucity of arborization distal to this.   4. Marked focal narrowing right A3 segment MICHAEL.   5. Moderate to marked narrowing left P3 segment PCA.   6. Intracranial circulation is otherwise normal.    Neck CTA  1. No high-grade stenosis by NASCET criteria.   2. Emphysematous changes and partially visualized somewhat linear soft tissue in the of left lateral lung on the first couple slices of the exam. Consider further evaluation with chest imaging or comparison to prior studies if available.     Perfusion CT  1. Moderate zone of abnormal perfusion right PCA distrubution as detailed above.     Laboratory:  Laboratory  findings were communicated with the patient and wife who voiced understanding of the findings.    CBC: WBC 5.7, HGB 12.6(L),   BMP: glucose 121(H) o/w WNL (Creatinine 0.98)  INR: 1.19(H)  PTT: 35  Troponin (Collected 2348): 0.121(HH)    Interventions:  0032 Alteplase 5.66 mg IV  0033 Alteplase 50.95 mg IV    Emergency Department Course:    2342  EMS arrival.     2343 I performed an exam of the patient as documented above.     2344  Code stroke called.     2347 Wife arrival and further history was obtained.     2348 I spoke with Dr. Verde of the Stroke Neurology service from [facility] regarding patient's presentation, findings, and plan of care.   IV was inserted and blood was drawn for laboratory testing, results above.    2349 Patient leaves department for CT scan.     2351 Discussed plan of care with wife as well as the potential for the use of TPA and the possible risks involved.     2352 The patient was sent for a CT Head and Neck with contrast and CT Head W and W/out contrast while in the emergency department, results above.     2353 I checked on the patient to CT.    0011 The patient returned from CT, and I returned to check on the patient.     0012 I spoke with Dr. Drake of the Radiology service from [facility] regarding patient's presentation, findings, and plan of care.    0013 I returned to check on and update the patient and neurologist Dr. Verde who is preforming a virtual exam on the patient.     0018 I called pharmacy to inform them that we are ordering TPA for the patient.     0023 I spoke with Dr. Drake of the Radiology service from [facility] regarding patient's presentation, findings, and plan of care.    0024 I returned to update Dr. Verde who is still preforming the exam on the radiologist finding.     0026 I was informed the troponin returned at 0.121.     0039 I spoke with Dr. Garrett of the Hospitalist service from [facility] regarding patient's  presentation, findings, and plan of care.    0042 I personally reviewed the laboratory and imaging results with the patient and wife and answered all related questions prior to admission.    Impression & Plan      Medical Decision Making:  Peipto Hicks is a 89 year old male who presents to the emergency department today for evaluation of left-sided neglect and weakness.  The exact last known normal time is unclear, but it seems at the very least it was at 2000 this evening and possibly a little bit later.  On evaluation, the patient had clear left-sided deficits with left-sided neglect.  Code stroke was called upon arrival, and CT/CT perfusion did show some signs of a perfusion mismatch but no large vessel occlusion.  Patient was evaluated by Dr. Vrede with stroke neurology and after discussion of risks and benefits it was decided to proceed with TPA which was administered in the emergency department.  Patient was admitted to the intensive care unit under the hospitalist service.    Diagnosis:    ICD-10-CM    1. Cerebrovascular accident (CVA), unspecified mechanism (H) I63.9    2. Atrial fibrillation, unspecified type (H) I48.91         Disposition:   The patient is admitted into the care of Dr. Garrett to the ICU.    Critical Care time was 60 minutes for this patient excluding procedures.     CMS Diagnoses: The patient has stroke symptoms:           ED Stroke specific documentation           NIHSS PDF          Protocol PDF     Patient last known well time: 2000  ED Provider first to bedside at: 2342      tPA:   Administered. Risks and benefits of tPA were discussed with Patient and wife prior to administration.    If treating with tPA: Ensure SBP<185 and DBP<105 prior to treatment with IV tPA.  Administering IV tPA after treatment with IV labetalol, hydralazine, or nicardipine is reasonable once BP control is established.    Endovascular Retrieval:  Not initiated due to absence of proximal vessel  occlusion    Stroke Mimics were considered (including migraine headache, seizure disorder, hypoglycemia (or hyperglycemia), head or spinal trauma, CNS infection, Toxin ingestion and shock state (e.g. sepsis) .      Scribe Disclosure:  I, Dia Neely, am serving as a scribe at 11:45 PM on 5/19/2019 to document services personally performed by John Page MD based on my observations and the provider's statements to me.   EMERGENCY DEPARTMENT       John Page MD  05/20/19 0204

## 2019-05-20 NOTE — PROGRESS NOTES
ICU TEAM NOTE    89 M hx of HTN osteoarthritis, admit with Acute CVA with left side weakness and facial droop s/p TPA and admitted to ICU for further monitoring.Pt alert, interactive on exam, left side deficits. Noted large simple blister on right shin.   ICU will follow peripherally while in ICU. Defer primary management to hospitalist and neuro.     Sheldon Magallon

## 2019-05-20 NOTE — PHARMACY-ADMISSION MEDICATION HISTORY
Admission medication history interview status for the 5/19/2019  admission is complete. See EPIC admission navigator for prior to admission medications     Medication history source reliability:Good    Actions taken by pharmacist (provider contacted, etc):Called pts wife to verify PTA medication - Also reviewed information in sure scripts    Additional medication history information not noted on PTA med list :None    Medication reconciliation/reorder completed by provider prior to medication history? No    Time spent in this activity: 15 minutes    Prior to Admission medications    Medication Sig Last Dose Taking? Auth Provider   acetaminophen (TYLENOL) 500 MG tablet Take 2 tablets (1,000 mg) by mouth 2 times daily as needed for mild pain  Yes Mikal Roque MD   amLODIPine (NORVASC) 2.5 MG tablet Take 1 tablet (2.5 mg) by mouth daily  Yes Lucio Jennings MD

## 2019-05-21 NOTE — CONSULTS
"CLINICAL NUTRITION SERVICES  -  ASSESSMENT NOTE      RECOMMENDATIONS FOR MD/PROVIDER TO ORDER:     Daily multivitamin for wound healing     Malnutrition:   % Weight Loss:  None noted  % Intake: unable to assess  Subcutaneous Fat Loss:  Unable to assess  Muscle Loss:  Unable to assess  Fluid Retention:  None noted    Malnutrition Diagnosis: Unable to determine due to pt resting in blankets - unable to do physical assessment/diet history         REASON FOR ASSESSMENT  Pepito Hicks is a 89 year old male seen by Registered Dietitian for Admission Nutrition Risk Screen for stageable pressure injuries or large/non-healing wound or burn      NUTRITION HISTORY  Chart reviewed  Brief visit with pt this afternoon  He tells me that his wife does the cooking  Notes that he was eating \"good meals\" before current events  Pt seemed a bit confused - \"I don't know what is really going on or where I am supposed to go\"      CURRENT NUTRITION ORDERS  Diet Order:     NPO       SLP following pt:  5/20: SLP bedside swallow = moderate-severe oral-pharyngeal dysphagia and high aspiration risk for all po intake at present.  5/21: SLP = Rec NPO status except for 1-5 tsps of nectar thick liquids, crushed essential meds as needed with nursing supervision and verify/cue swallows.  Hold po if aspiration signs are noted/respiratory status declines.  Rec VSS 5/22 to fully evaluate aspiration risk       NUTRITION FOCUSED PHYSICAL ASSESSMENT (NFPA)  Completed:  No Pt wrapped in blankets, resting         Observed:    Unable to assess - pt's face looks thin     Obtained from Chart/Interdisciplinary Team:  Frail looking  Left hemiplegia  New dx DM  No edema  5/20:  WOCN =   Pt has roofed and unroofed blisters to BL lower extremities caused by increased edema. No acute sings or infection   Pt has unstageable pressure injury to BL IT, will add topical Iodosorb to help clean up these wounds. Wounds were present on admission.  No acute signs or " "symptoms of infection   Left hand skin tear caused by trauma. No acute signs or symptoms of infection     ANTHROPOMETRICS  Height: 5' 5\"  Weight:(5/21) 64 kg / 141 lbs 1.51 oz  Body mass index is 23.48 kg/m .  Weight Status:  Normal BMI  IBW: 61.8 kg  % IBW: 104%  Weight History: Wt appears stable over the past 7 months  Wt Readings from Last 10 Encounters:   05/21/19 64 kg (141 lb 1.5 oz)   10/15/18 65.3 kg (143 lb 14.4 oz)   07/17/17 67 kg (147 lb 11.2 oz)   07/01/16 65.6 kg (144 lb 9.6 oz)   01/22/15 63.7 kg (140 lb 6.4 oz)   09/25/14 64.2 kg (141 lb 9.6 oz)   09/26/13 65.5 kg (144 lb 8 oz)   10/30/12 67.9 kg (149 lb 12.8 oz)   10/28/11 68.5 kg (151 lb)   07/15/11 70.4 kg (155 lb 4 oz)         LABS  Labs reviewed    MEDICATIONS  Medications reviewed      ASSESSED NUTRITION NEEDS PER APPROVED PRACTICE GUIDELINES:    Dosing Weight (5/21) 64 kg  Estimated Energy Needs: 6236-8605 kcals (25-30 Kcal/Kg)  Justification: maintenance  Estimated Protein Needs: 75-95 grams protein (1.2-1.5 g pro/Kg)  Justification: preservation of lean body mass      MALNUTRITION:  % Weight Loss:  None noted  % Intake: unable to assess  Subcutaneous Fat Loss:  Unable to assess  Muscle Loss:  Unable to assess  Fluid Retention:  None noted    Malnutrition Diagnosis: Unable to determine due to pt resting in blankets - unable to do physical assessment/diet history      NUTRITION DIAGNOSIS:  Inadequate protein-energy intake related to NPO status per SLP as evidenced by pt meeting 0% est needs      NUTRITION INTERVENTIONS  Recommendations / Nutrition Prescription  NPO (diet per SLP recs)  Daily multivitamin for wound healing  .      Implementation  Nutrition education: Not appropriate at this time due to patient condition  .      Nutrition Goals  Pt to receive nutrition in the next 48-72 hrs  .      MONITORING AND EVALUATION:  Progress towards goals will be monitored and evaluated per protocol and Practice Guidelines                "

## 2019-05-21 NOTE — PROGRESS NOTES
ICU Multi-Disciplinary Note  Patient condition reviewed and discussed while on multidisciplinary rounds today. Mr. Hicks is a 89 year old male with PMH HTN who presented to the ED with left-sided weakness and facial droop. He received tPA for acute right occipital ischemic stroke likely secondary to new-onset atrial fibrillation. He is currently hemodynamically stable without the support of vasoactives and oxygenating adequately on 2L nasal cannula. No interventions were implemented by the ICU team.   The Critical Care service will continue to follow peripherally while the patient is within the ICU. We are readily available should issues arise. Please feel free to contact us for critical care issues with which we may be of assistance. For all other concerns, please contact primary service first.   CAROLA Jimenez

## 2019-05-21 NOTE — PROGRESS NOTES
"VASCULAR NEUROLOGY ATTENDING ATTESTATION     Admission Summary  Pepito Hicks is an 89 year old male who presents with left VF deficit. CTP showed right occipital perfusion deficit and right P2 occlusion.  He was found to have Afib.     Last 24 hours:  Left sided VF deficit.     Impression:   Acute right occipital, bilateral hemisphere, left cerebellar ischemic stroke due to Afib  Chronic left occipital ischemic stroke     Recommendations:  -MRI brain shows right multi-territory ischemic stroke with petechial hemorrhage in right occipital area  -Would wait 2 weeks prior to starting AC (Eliquis); Can start Aspirin now  -PT/OT     Yan Sandoval MD  Neurology     Please contact the stroke service with any questions: Feel free to call my cell phone     I, Yan Sandoval MD, was present with the medical student who participated in obtaining the history, performing the exam, and in the documentation of the note.  I have verified the history, personally reviewed the vitals, labs, neuroimaging, medications, examined the patient, and led medical decision making.  I agree with the assessment and plan documented in the student's note below.  My relevant summary and youssef findings are documented above.         [facility], [facility]    Vascular Neurology Progress Note    Name: Pepito Hicks  YOB: 1930  MRN: [id number]  Admission Date: 5/19/2019  Date of Service:05/21/2019   Hospital Day: 3                                             24 hour event summary:                                         Neuro checks remain unchanged. Pt has remained confused at times and can be difficult to arouse and participate with but this has been baseline since admission. Remains afebrile, vitals stable. Kwon placed this am, 5/21/19.     Physical Examination:     /75   Pulse 101   Temp 98.4  F (36.9  C) (Axillary)   Resp 10   Ht 1.651 m (5' 5\")   Wt 64 kg (141 lb 1.5 oz)   SpO2 95%  "  BMI 23.48 kg/m        General Appearance:   No acute distress, difficult to participate.   Cardiovascular: Regular rate and rhythm, no m/r/g  Lungs: CTAB, no wheezing, rhonci and crackles  Extremities: No clubbing, cyanosis, no rashes  Neuro:  Mental Status Exam:   Alert, awake and oriented. Dysarthric. Difficult to assess with lack of participation and difficulty hearing.   Cranial Nerves:  Eyes track to right and middle. Unable to move them left. No left side visual field confirmed. Unable to confirm facial sensation on left face. Facial strength weak on left side. Shoulder shrug strong bilaterally. Tongue midline. Hard of hearing.   Motor:  5/5 strength RUE. 3/5 strength LUE.  strength 4/5 on right and 3/5 on left.  Unable to lift left leg in exam.       Sensory:  Intact to light touch on right arm and leg. Unable to confirm sensation on left arm and leg.               Coordination:   FNF difficult to assess with pt visual field difficulties.   Gait:  Not assessed.     Medications:     Scheduled Meds:    furosemide  10 mg Intravenous Daily     insulin aspart  1-4 Units Subcutaneous Q4H     metoprolol  2.5 mg Intravenous Q8H     PRN Meds: acetaminophen **OR** acetaminophen, albuterol, glucose **OR** dextrose **OR** glucagon, hydrALAZINE, labetalol, naloxone, - MEDICATION INSTRUCTIONS -, ondansetron **OR** ondansetron, - MEDICATION INSTRUCTIONS -    Data:     ROUTINE IP LABS (Last six results)  CBC RESULTS:     Recent Labs   Lab Test 05/19/19  2348 07/15/11  1414   WBC 5.7  --    RBC 3.81*  --    HGB 12.6* 14.2   HCT 40.1  --      --      Basic Metabolic Panel:  Recent Labs   Lab Test 05/19/19  2348 10/15/18  1542 07/17/17  1304 07/01/16  1110 07/21/15  0840 01/14/15  0912  10/30/12  1105    137 139 139  --  140  --  140   POTASSIUM 3.8 4.3 4.1 4.1  --  4.4  --  3.9   CHLORIDE 107 102 104 105  --  104  --  102   CO2 31 30 30 29  --  28  --  29   BUN 28 13 12 13  --  16  --  16   CR 0.98 0.86 0.91  0.86 0.89 0.93   < > 0.91   * 105* 97 111*  --  116*  --  96   PRATEEK 8.7 9.7 9.4 9.2  --  9.4  --  9.0    < > = values in this interval not displayed.     Liver panel:  Recent Labs   Lab Test 05/20/19  0636   PROTTOTAL 6.5*   ALBUMIN 3.2*   BILITOTAL 0.7   ALKPHOS 81   AST 34   ALT 36     INR  Recent Labs   Lab Test 05/19/19  2348   INR 1.19*      Lipid Profile:  Recent Labs   Lab Test 05/20/19  0636 07/01/16  1110 01/14/15  0912 10/30/12  1105   CHOL 127 169 173 195   HDL 58 78 94 74   LDL 56 76 63 109   TRIG 63 75 78 62   CHOLHDLRATIO  --   --  1.8 2.7     Thyroid Panel:  Recent Labs   Lab Test 05/20/19  0636   TSH 2.02      Vitamin B12: No lab results found.   Vitamin D:  Recent Labs   Lab Test 01/14/15  0912 09/26/13  1158 10/30/12  1105   VITDT 23* 23* 20*     A1C:   Recent Labs   Lab Test 05/20/19  0636   A1C 6.8*     Troponin I:   Recent Labs   Lab Test 05/20/19  1543 05/20/19  0636 05/19/19  2348   TROPI 0.095* 0.119* 0.121*     CPK:   Recent Labs   Lab Test 05/20/19  0636        Ammonia: No lab results found.  Most Recent 6 Bacteria Isolates From Any Culture (See EPIC Reports for Culture Details):  Recent Labs   Lab Test 05/20/19  0430   CULT Culture in progress       Imaging/ workup:     MRI preliminary results :    IMPRESSION:    1. Multiple recent ischemic infarcts involving the cerebral  hemispheres bilaterally and left cerebellar hemisphere including a  large right PCA territory infarct. Given infarcts in multiple vascular  territories, embolic infarcts from a central embolic source should be  considered.   2. Diffuse cerebral volume loss and cerebral white matter changes  consistent with chronic small vessel ischemic disease.       Assessment and Plan:                                           Pepito Hicks is a 89 yo M with PMH of essential HTN, spina bifida, BCC, osteoarthritis, who presented with left sided weakness, facial droop and neglect, found to have acute right occipital ischemic  stroke likely from afib. Pt was given tPA in ED 0032 5/20/19. Pt has remained vitally stable since admission with no changes in neuro exam and is currently in ICU. MRI confirmed multiple past infarcts including acute right posterior PCA, chronic left occipital ischemic stroke, and chronic thalamic lacunar stroke.     Acute Ischemic Stroke (post tPA) Plan secondary to atrial fibrillation   - new A fib noted on monitoring and EKG, rate controlled.   - Okay to start aspirin today since after 24 hours post IV tPA  - A fib CHADSS score of 6/6. Recommend starting eliquis in 2 weeks post tpa.   - Labetalol PRN to maintain BP < 180/105  - Euthermia, Euglycemia  - Head of bed elevated  - oral Statin once able to tolerate PO  - MRI completed 5/21/19   - TTE with Bubble Study complete positive for afib. Negative for PFO.   - Telemetry, EKG  - Bedside Glucose Monitoring  - A1c 6.8.  Lipid Panel wnl. Troponin x 3 - .121, .119  - PM&R  - Stroke Education  - Depression Screen  - transfer out of ICU today    PT/OT/Speech:  --continue NPO status except 1-5 tsps nectar thick liquids and crushed essential meds.       Case was seen and discussed with Dr. Lori MD.     Laura Orosco, MS4  May 21, 2019

## 2019-05-21 NOTE — PLAN OF CARE
"Discharge Planner OT   Patient plan for discharge: none stated.   Current status: Eval completed and treatment initiated. Pt lives in a house with his wife. Pt reports he has spina bifida and was walking with a SEC not well but I, pt doesn't want to use a ww as it will cause increased pain. Pt I with ADL's and functional mobility. Pt admitted due to R posterior cerebral infarct, s/p TPA. Due to A-fib. Pt requires MAX A x 2 supine <> sit, pt sat at EOB with MOD to max A with L Lateral lean and cues to try to sit upright with decreased follow through. Pt continually asking same question, \"when did I get here and why.\" pt educated in situation and diagnosis. Pt oriented to self and knows he is in the hospital and current month and year, but needed education in situation. Attempted sit <> stand with max A X2, however, decreased success and needed to lay down again. Pt able to wash face with R UE/hand with SBA.   Barriers to return to prior living situation: L sided weakness and incoordination. Decreased cognition with STM, decreased balance and possible L visual field cut, difficult to assess at this time.   Recommendations for discharge: TCU  Rationale for recommendations: Pt will require therapy to increase ADL and functional mobility independence and safety to PLOF. Pt with baseline mobility impairments due to spina bifida, will continue to monitor progress and frequency and tolerance of therapy.        Entered by: Yecenia Farris 05/21/2019 10:16 AM       "

## 2019-05-21 NOTE — PROVIDER NOTIFICATION
MD Notification    Notified Person: MD    Notified Person Name: Nohemi     Notification Date/Time:1615    Notification Interaction: web paged    Purpose of Notification:clarify IVF fluids, target HR       Orders Received: changed metoprolol, IVF at 30/hr    Comments: MD wants to increase scheduled metoprolol rather than add PRN doses

## 2019-05-21 NOTE — PLAN OF CARE
Alert. Orientation varies. Confused and forgetful. Neuros unchanged. Remained in Afib. Contact iso. Code status changed, see MD note. Will not lay on back d/t pain. Kwon placed for retention. Plan to transfer to neuro floor. Wife and daughter at bedside and updated on plan of care. Continue to monitor.

## 2019-05-21 NOTE — PROGRESS NOTES
05/21/19 1100   Quick Adds   Type of Visit Initial PT Evaluation   Living Environment   Lives With spouse   Living Arrangements house   Home Accessibility stairs to enter home;stairs within home   Number of Stairs, Main Entrance 7   Stair Railings, Main Entrance railing on right side (ascending)   Number of Stairs, Within Home, Primary 4   Stair Railings, Within Home, Primary railing on right side (ascending)   Self-Care   Equipment Currently Used at Home walker, rolling;cane, straight   Functional Level Prior   Ambulation 1-->assistive equipment   Transferring 1-->assistive equipment   Toileting 0-->independent   Bathing 2-->assistive person   Communication 0-->understands/communicates without difficulty   Swallowing 2-->difficulty swallowing liquids   Cognition 1 - attention or memory deficits   Fall history within last six months yes   Number of times patient has fallen within last six months 1   Which of the above functional risks had a recent onset or change? ambulation   Prior Functional Level Comment Per spouse pt had been declining over last month, unable to amb well with FWW, could no longer use cane, very difficulty to navigate steps, 1 at a time.  very limited walking   General Information   Onset of Illness/Injury or Date of Surgery - Date 05/20/19   Referring Physician Philippe Garrett   Patient/Family Goals Statement pt and spouse uncertain about discharge   Pertinent History of Current Problem (include personal factors and/or comorbidities that impact the POC) Pepito Hicks is a 89 year old male with past medical history significant for essential hypertension, spina bifida, basal cell carcinoma, osteoarthritis and recently undergoing evaluation for lower extremity edema and weeping wounds who presented to the emergency department as his wife noticed left-sided weakness and facial droop.  Code stroke was called and patient received TPA with some improvement noted in left extremity motor  function.Patient was admitted for further evaluation and management on may 19th .Patient is monitored in ICU for post TPA cares. He was also found to have new onset diabetes and possible ischemic cardiomyopathy.cardiology was also consulted , patient remains very frail , following commands some times other times slightly confused   Precautions/Limitations fall precautions;oxygen therapy device and L/min   Weight-Bearing Status - LUE full weight-bearing   Weight-Bearing Status - RUE full weight-bearing   Weight-Bearing Status - LLE full weight-bearing   Weight-Bearing Status - RLE full weight-bearing   General Observations spouse present, provides info that pt unable to lay supine or lean back against back of chair for stting, but sits forward in chair at home   General Info Comments MD entered during session with additional family   Cognitive Status Examination   Orientation person   Level of Consciousness lethargic/somnolent   Follows Commands and Answers Questions able to follow single-step instructions;50% of the time   Memory impaired   Cognitive Comment pt confused in location and situation   Pain Assessment   Patient Currently in Pain Yes, see Vital Sign flowsheet  (low back with spina bifida)   Integumentary/Edema   Integumentary/Edema Comments B LE with edema and weeping wounds   Range of Motion (ROM)   ROM Comment limited LE with decreased DF and  knee extension   Strength   Strength Comments L LE weakness, unable to assess well as pt on his side and not following well   Bed Mobility   Bed Mobility Comments max A x 2 to move to sit per OT note, pt declining   Transfer Skills   Transfer Comments unable   Gait   Gait Comments unable   Balance   Balance Comments per OT assessment sitting balance poor requiring A to maintain   Sensory Examination   Sensory Perception Comments numbness in L LE   Muscle Tone   Muscle Tone Comments increased in L UE   General Therapy Interventions   Planned Therapy Interventions  "balance training;bed mobility training;transfer training;strengthening;motor coordination training;neuromuscular re-education;ROM;stretching   Clinical Impression   Criteria for Skilled Therapeutic Intervention yes, treatment indicated   PT Diagnosis difficulty with all mob   Influenced by the following impairments pain, weakness, LE edema and wounds, cardiac compromise, respiratory compromise, cog deficits, swallowing deficits, decreased balance and ROM and act aparna, back pain with limited aparna for suipne and sitting   Functional limitations due to impairments impaired functional mob I and safety   Clinical Presentation Unstable/Unpredictable   Clinical Presentation Rationale 5+ deficits   Clinical Decision Making (Complexity) High complexity   Therapy Frequency` 5 times/week   Predicted Duration of Therapy Intervention (days/wks) 7 days   Anticipated Discharge Disposition Transitional Care Facility;Long Term Care Facility   Risk & Benefits of therapy have been explained Yes   Patient, Family & other staff in agreement with plan of care Yes   [facility] TM \"6 Clicks\"   2016, Trustees of [facility], under license to [employer].  All rights reserved.   6 Clicks Short Forms Basic Mobility Inpatient Short Form   [facility]  \"6 Clicks\" V.2 Basic Mobility Inpatient Short Form   1. Turning from your back to your side while in a flat bed without using bedrails? 1 - Total   2. Moving from lying on your back to sitting on the side of a flat bed without using bedrails? 1 - Total   3. Moving to and from a bed to a chair (including a wheelchair)? 1 - Total   4. Standing up from a chair using your arms (e.g., wheelchair, or bedside chair)? 1 - Total   5. To walk in hospital room? 1 - Total   6. Climbing 3-5 steps with a railing? 1 - Total   Basic Mobility Raw Score (Score out of 24.Lower scores equate to lower levels of function) 6   Total Evaluation Time   Total Evaluation Time (Minutes) 20 "

## 2019-05-21 NOTE — PLAN OF CARE
Neuro exam is difficult to obtain at times due to confusion, lack of participation and inability to arouse pt. Unable to call appropriately. Attempts to get out of bed in a panic when needing to urinate. Oriented to self month and year. HERNANDEZ, left sided weakness and spasticity. KARMA sensation. Afib, vitals WNL. Respiratory pattern irregular, tachypnea at times with prolonged inhalations, 2L NC.

## 2019-05-21 NOTE — PLAN OF CARE
Pt neuros remain unchanged. PERRL. Left neglect, field cut-difficult to keep engaged in visual exam. Consistently oriented to self, forgetful of  year and situation.  Left facial droop. HERNANDEZ. Left sensation absent in upper and lower extremity. Able to move all extremities. Lungs clear, on 2 L NC. Bedrest. NPO pending speech eval. Tylenol given for lower back pain.

## 2019-05-21 NOTE — PLAN OF CARE
Discharge Planner PT   Patient plan for discharge: none stated  Current status: PT eval completed, pt admitted with CVA s/p TPA, with R occipital ischemic stroke due to a fib.  Pt with multiple medical issues, unable to eat or drink, acute systolic congestive heart failure with poss ischemic cardiomyopathy with ejection fraction of 30-35%, new onset DM.  Pt previously living with spouse in home with split level, stairs to enter to living and more steps to bed and bath, per spouse pt having significant difficulty navigating these over last month with decrease strength and aparna for mob. Attempting to use FWW at home but difficulty pushing through carpet.  PT with hx of spina bifida and unable to aparna lying supine or sitting back against chair back.  Pt currently requires A x 2 to roll to side and max A to move to sit, poor sitting balacne per OT assessment, declined to EOB with PT, does not follow commands well for LE activity.  Session interrupted by MD with long discussion with family.  Per discussion with RN re: MD report, will hold on mob to establish POC goals due to pt's current condition.  Barriers to return to prior living situation: dep for all mob, confused, unable to eat or drink, SOB, L visual field cut and L sided weakness, decreased balance with increased fall risk  Recommendations for discharge: TCU  Rationale for recommendations: Pt may benefit from cont therapies to progress strength and act aparna, balance and coordination to improve functional mob I and safety.  If pt does not aparna or is unable to participate may need LTC       Entered by: MIGUEL ANGEL CASAREZ 05/21/2019 12:09 PM

## 2019-05-21 NOTE — PROGRESS NOTES
[facility] [place]    HOSPITALIST PROGRESS NOTE :   --------------------------------------------------    Date of Admission:  5/19/2019    Cumulative Summary: Pepito Hicks is a 89 year old male with past medical history significant for essential hypertension, spina bifida, basal cell carcinoma, osteoarthritis and recently undergoing evaluation for lower extremity edema and weeping wounds who presented to the emergency department as his wife noticed left-sided weakness and facial droop.  Code stroke was called and patient received TPA with some improvement noted in left extremity motor function.Patient was admitted for further evaluation and management on may 19th .Patient is monitored in ICU for post TPA cares. He was also found to have new onset diabetes and possible ischemic cardiomyopathy.cardiology was also consulted , patient remains very frail , following commands some times other times slightly confused.    Assessment & Plan     Active Problems:  Acute right occipital ischemic stroke due to A. Fib: Patient presented with left-sided weakness and facial droop, received TPA in the emergency room, maintaining his blood pressure has been n.p.o. after evaluation of his speech therapy and is receiving gentle fluids at 50 cc/h.  Patient has been evaluated by neurology.  CT scan of the head did not show any acute ischemia or hemorrhage did show old left occipital lobe infarct and old left thalamic lacunar infarct.  CT scan of the head with brain perfusion showed right posterior cerebral artery distribution transit time prolongation with preservation of cerebral blood volumes, suggesting at risk tissue.  CTA of head and neck showed right posterior cerebral artery occlusion at the P2 segment background of the scattered atherosclerotic irregularity including high-grade stenosis of left posterior cerebral artery and right anterior cerebral artery segment were also noticed.  CT of the neck showed patent  cervical vasculature with moderate plaquing at the carotid bifurcation without evidence of flow-limiting stenosis.  At this time neurology is recommending to hold any anticoagulation in place for 24 hours post TPA.  Chronic left occipital ischemic stroke;    --Continue to monitor patient closely on telemetry.  --Currently patient remains n.p.o., will follow up on speech evaluation.  --Continue gentle IV fluids at 50 cc/h as patient is n.p.o.  --Discussed with bedside RN regarding bladder scan and possibly placing Ricci catheter if patient is retaining.  --Appreciate neurology help, plan for MRI of the brain today.  --PT, OT and speech therapy evaluation.  --Once patient is able to take oral medications, plan to start patient on statin.  -- placing ricci catheter due to retention, plan to start on Flomax once able to have oral med's   -- Patient has high chads vascular score , will discuss with neurology regarding starting patient on anticoagulation.    Acute systolic congestive heart failure with possible ischemic cardiomyopathy with ejection fraction of 30 to 35%: No known history of congestive heart failure or coronary artery disease, echo is significantly abnormal for moderate to severe global hypokinesia of left ventricle, decreased right ventricular systolic function and moderate biatrial enlargement.  Patient also have valvular abnormalities considering moderate 2+ MR, 1+ AR and mild to moderate valvular aortic stenosis in the presence of pulmonary hypertension.  Cardiology was consulted, appreciate their help they are concerned for possible ischemic cardiomyopathy and has started patient on very low-dose IV Lopressor considering patient is n.p.o.  New onset atrial fibrillation: Most likely etiology of his stroke, considering his multiple previous strokes, most likely cardioembolic phenomena might have played a role  Peripheral arterial disease : right leg is colder as compared to left with bilateral lower  extremities ischemic ulceration     -- continue to monitor patient closely.  -- Continue Lopressor 2.5 mg IV every 8 hours, tolerating it well.  --Plan to stop IV fluids considering patient has low ejection fraction and elevated BNP.    --Currently he does not have any crackles and his mucous membrane is dry.  --We will follow-up on neurology recommendation regarding anticoagulation.  --Currently due to patient frail status with on and off confusion, further goals of care discussion including ischemic work-up needs to be discussed in detail with patient and family, so far no family has been seen in hospital we will try to call his wife on phone.  -- wound care for lower extremities wound     New onset diabetes: Hemoglobin A1c came back 6.8, patient does not have known history of diabetes mellitus, probably will benefit from getting starting on oral hypoglycemic agents at discharge.    --Continue patient on low-dose sliding scale insulin  --Checking blood sugars as per NPO protocol.      Addendum: I did meet with patient's wife roland and his daughter this morning, patient clinical status was discussed in detail but updated regarding patient, no diagnosis of atrial fibrillation, concerns and questions regarding anticoagulation, new onset diabetes mellitus, ongoing swallowing issues and patient currently being n.p.o. and undergoing speech therapy evaluation.  They showed understanding regarding patient current clinical status, wife and daughter confirmed that patient is DNR and has the paperwork at home his CODE STATUS was updated.  They also told me that if it comes to the point where patient is not able to keep up with oral intake patient did not want to be on tube feedings.  We discussed about following up on a speech therapy evaluation and then reassessing if patient is or is not able to be started on diet. They were in agreement with the plan.    Diet: NPO  Kwon Catheter: not present  DVT Prophylaxis: Pneumatic  Compression Devices and Anti-embolisim stockings (TEDs)  Code Status: DNR    The patient's care was discussed with the Bedside Nurse and Patient.    Disposition Plan   Expected discharge: 2 - 3 days,  Transfer to neuro floor with telemetry once ok with neurology     Entered: Trudi Song MD 05/21/2019, 8:20 AM       Trudi Song MD, FACP  Text Page (7am - 6pm)    ----------------------------------------------------------------------------------------------------------------------      Interval History   Patient was seen and examined this morning, able to answer few questions, speech is slightly slurred, denying any pain, told me that he is sleepy.  Told me that his wife does not drive and he does have a daughter who lives in [place] is okay with updating his family regarding his health.  I tried to have more conversation with him regarding CODE STATUS but patient told me that he is unable to decide anything at this point and he does not know what to decide.  No family members are present currently in room.    -Data reviewed today: I reviewed all new labs and imaging results over the last 24 hours.    I personally reviewed no images or EKG's today.    Physical Exam   Temp: 98.4  F (36.9  C) Temp src: Axillary BP: 128/77 Pulse: 92 Heart Rate: 96 Resp: 20 SpO2: 96 % O2 Device: Nasal cannula Oxygen Delivery: 2 LPM  Vitals:    05/19/19 2346 05/20/19 0200 05/21/19 0400   Weight: 62.9 kg (138 lb 10.7 oz) 65.1 kg (143 lb 8.3 oz) 64 kg (141 lb 1.5 oz)     Vital Signs with Ranges  Temp:  [98  F (36.7  C)-98.7  F (37.1  C)] 98.4  F (36.9  C)  Pulse:  [] 92  Heart Rate:  [] 96  Resp:  [7-24] 20  BP: (107-158)/(62-99) 128/77  SpO2:  [92 %-100 %] 96 %  I/O last 3 completed shifts:  In: 873.33 [I.V.:873.33]  Out: 550 [Urine:550]    GENERAL: Alert , awake and oriented. Frail looking, elderly gentleman NAD, slightly slurred speech   HEENT: Normocephalic. EOMI. No icterus or injection. Nares normal.   LUNGS: Clear to  auscultation. No dyspnea at rest.does have prolonged exp phase pattern in between breaths    HEART: irregular , slightly tachycardic . Extremities perfused.   ABDOMEN: Soft, nontender, and nondistended. Positive bowel sounds.   EXTREMITIES: No LE edema noted.   NEUROLOGIC: left sided weakness , more prominent in upper extremity 3/5 as compared to lower extremity, left facial droop , left side neglect , continues to have visual field deficit     Medications     - MEDICATION INSTRUCTIONS -       sodium chloride 50 mL/hr at 05/20/19 1600     - MEDICATION INSTRUCTIONS -         insulin aspart  1-4 Units Subcutaneous Q4H     metoprolol  2.5 mg Intravenous Q8H       Data   Recent Labs   Lab 05/20/19  1543 05/20/19  0636 05/19/19  2348   WBC  --   --  5.7   HGB  --   --  12.6*   MCV  --   --  105*   PLT  --   --  208   INR  --   --  1.19*   NA  --   --  144   POTASSIUM  --   --  3.8   CHLORIDE  --   --  107   CO2  --   --  31   BUN  --   --  28   CR  --   --  0.98   ANIONGAP  --   --  6   PRATEEK  --   --  8.7   GLC  --   --  121*   ALBUMIN  --  3.2*  --    PROTTOTAL  --  6.5*  --    BILITOTAL  --  0.7  --    ALKPHOS  --  81  --    ALT  --  36  --    AST  --  34  --    TROPI 0.095* 0.119* 0.121*       Imaging:   Recent Results (from the past 24 hour(s))   US CHIN Doppler No Exercise    Narrative    ULTRASOUND ANKLE-BRACHIAL INDEX DOPPLER NO EXERCISE, 1-2 LEVELS,  BILATERAL  5/20/2019 9:51 AM     HISTORY: Adm with stroke status post TPA. Bilateral leg wounds, edema.    COMPARISON: None.    FINDINGS:  Right CHIN: Cannot be calculated secondary to noncompressible vessels.  Left CHIN: 1.19.  Right first digital pressure 0.54  Left first digital pressure 0.52    Waveforms: Triphasic on the left. Triphasic on the right posterior  tibial and biphasic in the right dorsalis pedis. Normal digital  waveforms bilaterally.      Impression    IMPRESSION:   1. Right CHIN could not be calculated secondary to noncompressible  vessels.  2. Left  ABIs normal without evidence of arterial insufficiency.  3. Abnormal digital pressures bilaterally, however waveforms are  normal bilaterally.     CHIN CRITERIA:  >0.95 Normal  0.90 - 0.94 Mild  0.5 - 0.89 Moderate  0.2 - 0.49 Severe  <0.2 Critical    SNEHA ANDERSEN DO

## 2019-05-21 NOTE — PROGRESS NOTES
05/21/19 0944   Quick Adds   Type of Visit Initial Occupational Therapy Evaluation   Living Environment   Lives With spouse   Living Arrangements house  (split level)   Home Accessibility stairs to enter home;stairs within home   Transportation Anticipated car, drives self   Self-Care   Equipment Currently Used at Home cane, straight   Functional Level   Ambulation 1-->assistive equipment   Transferring 1-->assistive equipment   Toileting 0-->independent   Bathing 2-->assistive person   Dressing 2-->assistive person   Eating 0-->independent   Communication 0-->understands/communicates without difficulty   Prior Functional Level Comment Pt I with ADLs/IADL's, reports he drives and manages his own meds, hires for lawn care and pt's wife does the cooking, cleaning and laundry.    General Information   Onset of Illness/Injury or Date of Surgery - Date 05/19/19   Referring Physician Philippe Garrett MD   Patient/Family Goals Statement none stated   Additional Occupational Profile Info/Pertinent History of Current Problem Pepito Hicks is a 89 year old male with history of hypertension, spina bifida, basal cell carcinoma, osteoarthritis and recent evaluation for lower extremity edema and weeping wounds who presented to the ED his wife with complaints of left-sided weakness and facial droop.  Code stroke was called and he received TPA with some improvement already noted in left extremity motor function. Acute right occipital ischemic stroke due to A. Fib and Acute systolic congestive heart failure with possible ischemic cardiomyopathy with ejection fraction of 30 to 35%   Precautions/Limitations fall precautions   Cognitive Status Examination   Orientation person;time  (cues for which hospital and situation.)   Level of Consciousness alert   Follows Commands (Cognition) WFL;repetition of directions required   Memory impaired   Cognitive Comment pt asking same questions continually, needing reorienation as  to how andwhy he is in the hosptial.    Visual Perception   Visual Perception Wears glasses   Visual Field appears to have possible L field cut   Pain Assessment   Patient Currently in Pain   (chronic back pain due to spina bifida, did not rate)   Range of Motion (ROM)   ROM Comment R UE AROM apears WFL, L UE limited with impaired coordination. difficulty assessing.    Strength   Strength Comments B UE strength no formall assessed, increased L UE weakness noted.   Coordination   Upper Extremity Coordination Left UE impaired   Gross Motor Coordination max impairment   Fine Motor Coordination max impairment   Bed Mobility Skill: Scooting/Bridging   Level of [place]: Scooting/Bridging maximum assist (25% patients effort)   Bed Mobility Skill: Sit to Supine   Level of [place]: Sit/Supine maximum assist (25% patients effort)   Physical Assist/Nonphysical Assist: Sit/Supine 2 persons   Bed Mobility Skill: Supine to Sit   Level of [place]: Supine/Sit maximum assist (25% patients effort)   Physical Assist/Nonphysical Assist: Supine/Sit 2 persons   Transfer Skill: Bed to Chair/Chair to Bed   Level of [place]: Bed to Chair unable to perform   Transfer Skill: Sit to Stand   Level of [place]: Sit/Stand maximum assist (25% patients effort)   Physical Assist/Nonphysical Assist: Sit/Stand 2 persons  (attempted with decreased success)   Transfer Skill: Toilet Transfer   Level of [place]: Toilet unable to perform   Upper Body Dressing   Level of [place]: Dress Upper Body maximum assist (25% patients effort)   Lower Body Dressing   Level of [place]: Dress Lower Body dependent (less than 25% patients effort)   Toileting   Level of [place]: Toilet dependent (less than 25% patients effort)   Grooming   Level of [place]: Grooming minimum assist (75% patients effort)   Activities of Daily Living Analysis   Impairments Contributing to Impaired Activities of Daily Living balance  "impaired;cognition impaired;coordination impaired;postural control impaired;strength decreased;pain   General Therapy Interventions   Planned Therapy Interventions ADL retraining;cognition;neuromuscular re-education;transfer training;visual perception;home program guidelines;progressive activity/exercise;risk factor education   Clinical Impression   Criteria for Skilled Therapeutic Interventions Met yes, treatment indicated   OT Diagnosis decreased ADL/IADL's and functional mobility   Influenced by the following impairments impaired balance, L sided decreased strength and incoordination, possible L visual field cut an decreased STM, baseline back pain, per pt reports spina bifida   Assessment of Occupational Performance 5 or more Performance Deficits   Identified Performance Deficits impaired I with dressing, toilet and shower transfer, driving, med mgmt.    Clinical Decision Making (Complexity) High complexity   Therapy Frequency daily   Predicted Duration of Therapy Intervention (days/wks) 7 days   Anticipated Discharge Disposition Acute Rehabilitation Facility   Risks and Benefits of Treatment have been explained. Yes   Patient, Family & other staff in agreement with plan of care Yes   [facility] AM-PAC  \"6 Clicks\" Daily Activity Inpatient Short Form   1. Putting on and taking off regular lower body clothing? 1 - Total   2. Bathing (including washing, rinsing, drying)? 1 - Total   3. Toileting, which includes using toilet, bedpan or urinal? 1 - Total   4. Putting on and taking off regular upper body clothing? 2 - A Lot   5. Taking care of personal grooming such as brushing teeth? 3 - A Little   6. Eating meals? 3 - A Little   Daily Activity Raw Score (Score out of 24.Lower scores equate to lower levels of function) 11   Total Evaluation Time   Total Evaluation Time (Minutes) 10     "

## 2019-05-21 NOTE — CONSULTS
Care Transition Initial Assessment - SW     Met with: PATIENT   Active Problems:    CVA (cerebral vascular accident) (H)       DATA  Lives With: spouse   Living Arrangements: house  Quality of Family Relationships: involved, supportive  Description of Support System: Supportive, Involved  Who is your support system?: Wife  Support Assessment: Adequate family and caregiver support.   Identified issues/concerns regarding health management: SW following for discharge needs  Quality of Family Relationships: involved, supportive  Transportation Anticipated: car, drives self    ASSESSMENT  Cognitive Status:    Concerns to be addressed: Patient is a 89 year old male who was admitted to the hospital for CVA. Prior to hospitalization patient was living at home with spouse. Patient was assessed by therapy and they are recommending TCU at discharge. SW spoke with patient's spouse and explained the reasoning for TCU's. Patient's spouse is agreeable to having referrals sent to [facility] TCU's. SW will send referrals to [facility], [facility].     PLAN  Financial costs for the patient includes   Patient given options and choices for discharge   Patient/family is agreeable to the plan?  YES  Patient Goals and Preferences: [facility].  Patient anticipates discharging to: TCU    MOOK Gonzalez   *[phone]

## 2019-05-21 NOTE — PROGRESS NOTES
[facility] Cardiology Progress Note       Assessment and Plan:   1.  Cardiomyopathy with moderate-to-severe left ventricular systolic dysfunction.  More than likely ischemic in origin. NT pro BNP 9323, JVP distended, will gently diurese.  2.  Mild to moderate aortic stenosis.   3.  Moderate mitral regurgitation.   4.  Pulmonary hypertension, likely secondary.   5.  Acute right posterior cerebral infarct, status post TPA administration.  A CT scan also shows old left occipital and old left thalamic infarct.   6.  Peripheral arterial disease.  Cold right leg with ischemic ulceration bilaterally.   7.  Atrial fibrillation, uncertain duration. Rate controlled.  Start anticoagulation when deemed suitable by neurology/hospitalist service  8.  Hypertension, under reasonable control.   9.  Diabetes mellitus.  HbA1c 6.8.                      Interval History:   Remains confused.  Denies CP/sob.                  Medications:     Current Facility-Administered Medications   Medication     acetaminophen (TYLENOL) tablet 650 mg    Or     acetaminophen (TYLENOL) Suppository 650 mg     albuterol (PROVENTIL) neb solution 2.5 mg     glucose gel 15-30 g    Or     dextrose 50 % injection 25-50 mL    Or     glucagon injection 1 mg     hydrALAZINE (APRESOLINE) injection 10 mg     insulin aspart (NovoLOG) inj (RAPID ACTING)     labetalol (NORMODYNE/TRANDATE) syringe 10 mg     metoprolol (LOPRESSOR) injection 2.5 mg     naloxone (NARCAN) injection 0.1-0.4 mg     NO anticoagulation/antiplatelet medications or NSAID in first 24 hours after alteplase (ACTIVASE) administration or after thrombectomy     ondansetron (ZOFRAN-ODT) ODT tab 4 mg    Or     ondansetron (ZOFRAN) injection 4 mg     sodium chloride 0.9% infusion     Stop alteplase (ACTIVASE) infusion IF any signs of major systemic bleeding, any neurological deterioration, development of severe headache, sudden severe elevation of blood pressure, or new nausea or vomiting and Call  "provider             Physical Exam:   Blood pressure 128/77, pulse 92, temperature 98.4  F (36.9  C), temperature source Axillary, resp. rate 20, height 1.651 m (5' 5\"), weight 64 kg (141 lb 1.5 oz), SpO2 96 %.  Wt Readings from Last 4 Encounters:   19 64 kg (141 lb 1.5 oz)   10/15/18 65.3 kg (143 lb 14.4 oz)   17 67 kg (147 lb 11.2 oz)   16 65.6 kg (144 lb 9.6 oz)         Vital Sign Ranges  Temperature Temp  Av.3  F (36.8  C)  Min: 98  F (36.7  C)  Max: 98.7  F (37.1  C)   Blood pressure Systolic (24hrs), Av , Min:107 , Max:158        Diastolic (24hrs), Av, Min:62, Max:99      Pulse Pulse  Av.3  Min: 74  Max: 108   Respirations Resp  Av.2  Min: 7  Max: 24   Pulse oximetry SpO2  Av.7 %  Min: 92 %  Max: 100 %         Intake/Output Summary (Last 24 hours) at 2019 0827  Last data filed at 2019 0600  Gross per 24 hour   Intake 873.33 ml   Output 300 ml   Net 573.33 ml          Cardiovascular:   Normal apical impulse, irregular rate and rhythm, distended JVP,  normal S1 and S2, 1/6 systolic murmur,   Chest clear.  No peripheral oedema         Data:     Labs:  Lab Results   Component Value Date     2019    Lab Results   Component Value Date    CHLORIDE 107 2019    Lab Results   Component Value Date    BUN 28 2019      Lab Results   Component Value Date    POTASSIUM 3.8 2019    Lab Results   Component Value Date    CO2 31 2019    Lab Results   Component Value Date    CR 0.98 2019        Lab Results   Component Value Date    WBC 5.7 2019    HGB 12.6 (L) 2019    HCT 40.1 2019     (H) 2019     2019     Lab Results   Component Value Date    TROPI 0.095 (H) 2019           Attestation:  I have reviewed today's vital signs, notes, medications, labs and imaging.         DR AURORA VANN MD 2019  8:27 AM       "

## 2019-05-21 NOTE — PLAN OF CARE
Discharge Planner SLP   Patient plan for discharge: Did not state  Current status: Swallow Tx with ongoing assessment was provided this am.  Patient demonstrated slight improvement in oral motor coordination during the session.  Patient continues to demonstrate decreased attention, SOB/variable RR, poor oral awareness, and delayed swallows.  Patient tolerated ice chips and nectar thick liquids by spoon with mod-max assist/cues to swallow.  No overt signs of aspiration were observed, slight wheeze noted x 1 during the session.  Unable to rule out silent aspiration at bedside.  Recommend NPO status except for 1-5 tsps of nectar thick liquids, crushed essential meds as needed with nursing supervision and verify/cue swallows.  Hold po if aspiration signs are noted/respiratory status declines.  Recommend proceeding with a video swallow study 5/22 to fully evaluate aspiration risk.  Will complete the study if family verbalizes consent.  Family notified of recommendation, and RN to confirm consent today as able.  Barriers to return to prior living situation: Level of assist, confusion  Recommendations for discharge: TCU, SLP services  Rationale for recommendations: SLP swallow Tx to maximize function for safe po intake, SLP speech Tx to maximize communication for daily needs       Entered by: Cathy Barnett 05/21/2019 8:55 AM      ADDENDUM: Verbal family consent obtained by nursing for study.  Will check on positioning for study given difficulty sitting reported per RN/PT.  Plan to see pt bedside 5/22 am to determine ability to proceed with study.

## 2019-05-22 NOTE — PLAN OF CARE
Discharge Planner SLP   Patient plan for discharge: Not addressed.   Current status: SLP: Patient seen for swallow treatment bedside. He was tired and needed max tactile and verbal cues to maintain alertness. He had constant mouth movements similiar to tardive dyskinesia. Unable to lateralize his tongue. Poor bolus control/coordination with Nectar thick liquids by the spoon and delayed swallow response. Tolerated 4 teaspoon. Given 1/4 teaspoon of pudding with poor oral manipulation and AP transport with oral residue on lateral sides of his tongue. Needed max verbal cues to swallow and when a liquids was used to clear there was overt Sx of aspiration. Recommend: 1. NPO except for a few teaspoons of nectar thick liquids with RN only 1-3 when fully alert. Due to lethargy video swallow study maybe postponed when he is more alert and able to take PO without overt Sx of aspiration. Will check his status later to see if appropriate.    Barriers to return to prior living situation: Acute stroke deficits.   Recommendations for discharge: TCU  Rationale for recommendations: Will need on going ST needs for swallowing. Patient is below his baseline.        Entered by: Tiffany Tran 05/22/2019 9:37 AM

## 2019-05-22 NOTE — PROGRESS NOTES
[facility] Progress Note  Chart Reviewed, Pt discussed in Interdisciplinary Rounds.   Pt has TCU referrals out.    Intervention:   HAYLEE received call from Maddie at [facility] that they would have a private room for pt as soon as tomorrow.  SW updated pt and he is agreeable to [facility] for TCU and requested that I contact his wife for update.  SW attempted to contact wife but was unable to reach or leave a message. HAYLEE will continue to try to update wife   of [facility] acceptance.  HAYLEE contacted Maddie at [facility] to accept bed on behalf of pt.  SW contacted pt's wife and she states she is interested in [place] also since is closer to her and her daughter who lives in [facility].  SW discussed pt's living arrangements and wife indicates that they live in a split level and states that they should have moved long time ago.  SW discussed home care, senior linkage, transport services, senior housing etc..as resources looking forward but wife currently vested in getting pt   To TCU.    Plan: Anticipate discharge to TCU when medically stable and cleared for discharge  Anticipated discharge placement: [facility]     Follow up plan: HAYLEE continuing.    MOOK Boyle  [facility]  Phone: [phone]

## 2019-05-22 NOTE — PLAN OF CARE
RN 3-7pm. Arrived from ICU, left sided deficits, droop, cut, neglect, hemiparesis. Pt rambling about position of the earth, being in a [place] [facility]. Restless, removing tele patches and gown. Afib RVR, 110's-120's after scheduled metoprolol.

## 2019-05-22 NOTE — PLAN OF CARE
Discharge Planner OT   Patient plan for discharge: none stated  Current status: Pt requires max A x 2 supine <> sit, pt sat at EOB with mod to max A with L Lateral lean and cues to try to sit upright with decreased follow through. Pt dependent for oral care and face washing, but compliant. Pt disoriented to place, time and person. Pt left arm occasionally tensed with abnormal tone and movements. Pt sit <> supine and repositioned w max Ax2. Set up patient w soft touch call light, informed nursing.   Barriers to return to prior living situation:  L sided weakness and incoordination. Decreased cognition with STM, decreased balance and possible L visual field cut, difficult to assess at this time.  Recommendations for discharge: TCU  Rationale for recommendations: Pt will require therapy to increase ADL and functional mobility independence and safety to PLOF. Pt with baseline mobility impairments due to spina bifida, will continue to monitor progress and frequency and tolerance of therapy.        Entered by: Victoria Cook 05/22/2019 9:10 AM

## 2019-05-22 NOTE — PLAN OF CARE
Discharge Planner PT   Patient plan for discharge: did not state  Current status: Pt in bed upon PT arrival, completing nursing cares. Pt requires maxA of 1 for rolling side to side. Pt was able to tolerate a limited amount of time in supine to participate with assisted BLE exercises. Requested to be positioned on his R side at end of session. Pt firmly declined trial of sitting at EOB or getting up to a chair with a lift due to fatigue after sitting EOB with OT this morning. Will plan to keep OT/PT times split am/pm to allow for improved activity tolerance.   Barriers to return to prior living situation: level of assist required, lift dependent for mobility, pain, medical status, weakness, fall risk, stairs  Recommendations for discharge: TCU  Rationale for recommendations: Pt would benefit from PT at TCU to progress strength, balance and mobility so pt can return home safely.        Entered by: Beverly Scott 05/22/2019 10:27 AM

## 2019-05-22 NOTE — PLAN OF CARE
RN 7 pm to 11:30 pm  left sided deficits, droop, cut, neglect, hemiparesis. Pt  talk is illogical. . Restless, removing tele patches and gown. Has a mitt on one hand.  Tele  Afib RVR, 110's-120's after scheduled metoprolol IV. Changed dressing on his buttock bilateral and placed one is the gluteal fold.  Dressing on shoulder is intact. He has bilateral dressing on lower legs which are CDI. He was at room air and sats were 77% applied 2 L NC and sats were 96%. Patient denies pain. Kwon patent.

## 2019-05-22 NOTE — PROGRESS NOTES
[facility] Cardiology Progress Note       Assessment and Plan:   1.  Cardiomyopathy with moderate-to-severe left ventricular systolic dysfunction.  Likely chronic and ischemic in origin. Appears fluid overloaded, suggest gentle diuresis, decrease iv fluids.  2.  Mild to moderate aortic stenosis.   3.  Moderate mitral regurgitation, mild tricuspid regurgitation.   4.  Pulmonary hypertension, likely secondary.   5.  Acute right posterior cerebral infarct, status post TPA administration.  A CT scan also shows old left occipital and old left thalamic infarct. Likely embolic in origin.  6.  Peripheral arterial disease.  Cold right leg with ischemic ulceration bilaterally.   7.  Atrial fibrillation, uncertain duration. Rate controlled.  Start anticoagulation in 2 weeks per neurology.  8.  Hypertension, under reasonable control.   9.  Diabetes mellitus.  HbA1c 6.8.   10.  Spina bifida.      Long discussion with daughter today.  Very guarded prognosis.  It is pt's wishes not to undergo aggressive treatment if no meaningful survival.   Continue iv beta blockers, statin/DOAC/ACEi if and when pt able to swallow.  Will sign off, please call if further questions.                   Interval History:   More somnolent.  Unable to perform swallow study.  Pt 's daughter says pt had no preexisting cardiac symptoms, but pt has been in chronic pain from spina bifida.  Thought leg ulceration was due to this condition.                  Medications:     Current Facility-Administered Medications   Medication     acetaminophen (TYLENOL) tablet 650 mg    Or     acetaminophen (TYLENOL) Suppository 650 mg     albuterol (PROVENTIL) neb solution 2.5 mg     aspirin (ASA) Suppository 300 mg     glucose gel 15-30 g    Or     dextrose 50 % injection 25-50 mL    Or     glucagon injection 1 mg     furosemide (LASIX) injection 10 mg     hydrALAZINE (APRESOLINE) injection 10 mg     insulin aspart (NovoLOG) inj (RAPID ACTING)     labetalol  "(NORMODYNE/TRANDATE) syringe 10 mg     metoprolol (LOPRESSOR) injection 5 mg     naloxone (NARCAN) injection 0.1-0.4 mg     NO anticoagulation/antiplatelet medications or NSAID in first 24 hours after alteplase (ACTIVASE) administration or after thrombectomy     ondansetron (ZOFRAN-ODT) ODT tab 4 mg    Or     ondansetron (ZOFRAN) injection 4 mg     sodium chloride 0.45% infusion             Physical Exam:   Blood pressure 137/88, pulse 106, temperature 98.1  F (36.7  C), temperature source Axillary, resp. rate 20, height 1.651 m (5' 5\"), weight 64 kg (141 lb 1.5 oz), SpO2 96 %.  Wt Readings from Last 4 Encounters:   19 64 kg (141 lb 1.5 oz)   10/15/18 65.3 kg (143 lb 14.4 oz)   17 67 kg (147 lb 11.2 oz)   16 65.6 kg (144 lb 9.6 oz)         Vital Sign Ranges  Temperature Temp  Av.5  F (36.9  C)  Min: 98.1  F (36.7  C)  Max: 98.9  F (37.2  C)   Blood pressure Systolic (24hrs), Av , Min:110 , Max:137        Diastolic (24hrs), Av, Min:57, Max:88      Pulse Pulse  Av  Min: 106  Max: 106   Respirations Resp  Av.6  Min: 18  Max: 20   Pulse oximetry SpO2  Av %  Min: 77 %  Max: 99 %         Intake/Output Summary (Last 24 hours) at 2019 1516  Last data filed at 2019 1512  Gross per 24 hour   Intake 70 ml   Output 700 ml   Net -630 ml          Cardiovascular:   Normal apical impulse, irregular rate and rhythm, JVP elevated 10 cm, normal S1 and S2, no S3 or S4, 1/6 systolic murmur.   Chest:  L basal insp crackles.  No peripheral oedema         Data:     Labs:  Lab Results   Component Value Date     2019    Lab Results   Component Value Date    CHLORIDE 110 2019    Lab Results   Component Value Date    BUN 29 2019      Lab Results   Component Value Date    POTASSIUM 4.2 2019    Lab Results   Component Value Date    CO2 29 2019    Lab Results   Component Value Date    CR 0.98 2019        Lab Results   Component Value Date    WBC " 7.4 05/22/2019    HGB 11.9 (L) 05/22/2019    HCT 38.0 (L) 05/22/2019     (H) 05/22/2019     05/22/2019     Lab Results   Component Value Date    TROPI 0.095 (H) 05/20/2019           Attestation:  I have reviewed today's vital signs, notes, medications, labs and imaging.         DR AURORA AVNN MD 5/22/2019  3:16 PM

## 2019-05-22 NOTE — PLAN OF CARE
Alert, disoriented, speech is garbled and illogical at times. Inconsistent with following commands, pulling at lines. Mitt on R hand. On 2L per NC, cont pulse ox. Denies any pain. Ax2 with lift. Turn and repo Q2 hours. Mepilex on bilateral buttocks changed. 1 also on coccyx and L shoulder. Wounds to BLE, dressings CDI. Incontinent of stool x1. Kwon in place. NPO, swallow study to be done today. IVF infusing at 30cc/hr for gentle hydration. L droop, L hemiparesis. Tele A fib RVR.     6105 Paged Dr. Wade about low urinary output of 100cc over night.

## 2019-05-22 NOTE — PLAN OF CARE
Pt here with R occipital stroke. Alert, but lethargic at times, difficult to assess orientation. Neuros difficult to assess, garbled speech, L droop, and LUE/LLE hemiparesis. VSS. Tele Afib w/ RVR. NPO, except 1-5tsp nectar thick liquids w/ RN. Up with A2 w/ lift, but pt has not been OOB. Denies pain. Plan to continue work w/ therapies and swallow study tomorrow if pt more alert and following commands. Discharge plan pending progress, nursing will continue to monitor.

## 2019-05-22 NOTE — PROGRESS NOTES
[facility]    HOSPITALIST PROGRESS NOTE :   --------------------------------------------------    Date of Admission:  5/19/2019    Cumulative Summary: Pepito Hicks is a 89 year old male with past medical history significant for essential hypertension, spina bifida, basal cell carcinoma, osteoarthritis and recently undergoing evaluation for lower extremity edema and weeping wounds who presented to the emergency department as his wife noticed left-sided weakness and facial droop.  Code stroke was called and patient received TPA with some improvement noted in left extremity motor function.Patient was admitted for further evaluation and management on may 19th .Patient is monitored in ICU for post TPA cares. He was also found to have new onset diabetes and possible ischemic cardiomyopathy.cardiology was also consulted , patient remains very frail , following commands some times other times slightly confused.    Assessment & Plan     Active Problems:  Acute right occipital ischemic stroke due to A. Fib: Patient presented with left-sided weakness and facial droop, received TPA in the emergency room, maintaining his blood pressure has been n.p.o. after evaluation of his speech therapy and is receiving gentle fluids at 50 cc/h.  Patient has been evaluated by neurology.  CT scan of the head did not show any acute ischemia or hemorrhage did show old left occipital lobe infarct and old left thalamic lacunar infarct.  CT scan of the head with brain perfusion showed right posterior cerebral artery distribution transit time prolongation with preservation of cerebral blood volumes, suggesting at risk tissue.  CTA of head and neck showed right posterior cerebral artery occlusion at the P2 segment background of the scattered atherosclerotic irregularity including high-grade stenosis of left posterior cerebral artery and right anterior cerebral artery segment were also noticed.  CT of the neck showed patent  cervical vasculature with moderate plaquing at the carotid bifurcation without evidence of flow-limiting stenosis.  At this time neurology is recommending to hold any anticoagulation in place for 24 hours post TPA.  MRI of the brain was done, showing multiple recent ischemic infarcts involving the cerebral hemispheres bilaterally and left cerebellar hemisphere including a large right PCA territory infarct.  Given infarcts in multiple vascular territories, embolic infarcts from a central embolic source should be considered.  Chronic left occipital ischemic stroke;    --Continue to monitor patient closely on telemetry.  --Currently patient remains n.p.o., will follow up on speech evaluation, unfortunately patient is not able to undergo video swallow evaluation today due to lethargic and is postponed till tomorrow.  --Will discuss with neurology if his normal saline can be changed to D5 0.2 saline at 50 cc/h as patient has been n.p.o. and seems to be getting dehydrated with hyponatremia.  -- In the mean time , will change his fluid to 0.45 saline at 50 ml/hr.  --Continue Kwon catheter care as per protocol.  --MRI results were reviewed as above.  --Physical and Occupational Therapy is recommending transitional care unit.  --Plan to start patient on Flomax once he is able to take oral medications.  -- Patient has high [id number] score , neurology is recommending to start anticoagulations in 2 weeks   -- Long discussion with wife and daughter regarding patient clinical status , they confirmed his DNR status and his wishes regarding no tube feedings.    Acute systolic congestive heart failure with possible ischemic cardiomyopathy with ejection fraction of 30 to 35%: No known history of congestive heart failure or coronary artery disease, echo is significantly abnormal for moderate to severe global hypokinesia of left ventricle, decreased right ventricular systolic function and moderate biatrial enlargement.  Patient also  have valvular abnormalities considering moderate 2+ MR, 1+ AR and mild to moderate valvular aortic stenosis in the presence of pulmonary hypertension.  Cardiology was consulted, appreciate their help they are concerned for possible ischemic cardiomyopathy and has started patient on very low-dose IV Lopressor considering patient is n.p.o.  New onset atrial fibrillation: Most likely etiology of his stroke, considering his multiple previous strokes, most likely cardioembolic phenomena might have played a role  Possible Peripheral arterial disease : right leg is colder as compared to left with bilateral lower extremities ischemic ulceration   Hypernatremia : most likely sec to depleted intravascular volume as patient being NPO.  Low Urine out out: poor urine out put sec to low oral intake     -- continue to monitor patient closely.  -- changed lopressor to IV every 6 hours and changed the dose to 5 mg due to persistent tachycardia , plan to start oral BB once patient can take oral med's   -- Patient is receiving minimal fluid due to his low ejection fraction and elevated BNP, but currently remains NPO and now is developing low urine out put , continues to have dry mucous membranes on examination   -- Neurology is ok to start patient on anticoagulation in 2 weeks  -- wound care for lower extremities wound   -- will also recommend out patient PVD workup once patient is not acutely sick.    New onset diabetes: Hemoglobin A1c came back 6.8, patient does not have known history of diabetes mellitus, probably will benefit from getting starting on oral hypoglycemic agents at discharge.    --Continue patient on low-dose sliding scale insulin  --Checking blood sugars as per NPO protocol.      Diet: NPO  Kwon Catheter: in place, indication: Strict 1-2 Hour I&O;Retention  DVT Prophylaxis: Pneumatic Compression Devices and Anti-embolisim stockings (TEDs)  Code Status: DNR    The patient's care was discussed with the Bedside Nurse  and Patient.    Disposition Plan   Expected discharge: 2 - 3 days, physical and Occupational Therapy are recommending transitional care unit, at this time patient cannot be discharged as he remains n.p.o. and receiving IV medications.  Entered: Trudi Song MD 05/22/2019, 1:07 PM       Trudi Song MD, FACP  Text Page (7am - 6pm)    ----------------------------------------------------------------------------------------------------------------------      Interval History    Patient was seen and examined this afternoon, lying in bed, was alert to person, trying to answer some questions does have some dysarthria, denying any pain.  According to bedside RN patient was very sleepy in the morning but seems to be little more awake during the afternoon time he was not able to get his speech swallow evaluation due to being lethargic and morning.    -Data reviewed today: I reviewed all new labs and imaging results over the last 24 hours.    I personally reviewed no images or EKG's today.    Physical Exam   Temp: 98.1  F (36.7  C) Temp src: Axillary BP: 137/88 Pulse: 106 Heart Rate: 98 Resp: 20 SpO2: 96 % O2 Device: Nasal cannula Oxygen Delivery: 2 LPM  Vitals:    05/19/19 2346 05/20/19 0200 05/21/19 0400   Weight: 62.9 kg (138 lb 10.7 oz) 65.1 kg (143 lb 8.3 oz) 64 kg (141 lb 1.5 oz)     Vital Signs with Ranges  Temp:  [98.1  F (36.7  C)-98.9  F (37.2  C)] 98.1  F (36.7  C)  Pulse:  [106] 106  Heart Rate:  [] 98  Resp:  [18-20] 20  BP: (110-137)/(57-88) 137/88  SpO2:  [77 %-99 %] 96 %  I/O last 3 completed shifts:  In: 395 [I.V.:395]  Out: 1000 [Urine:1000]    GENERAL: Alert , awake and oriented. Frail looking, elderly gentleman NAD, slightly slurred speech   HEENT: Normocephalic. EOMI. No icterus or injection. Nares normal.   LUNGS: Clear to auscultation. No dyspnea at rest.does have prolonged exp phase pattern in between breaths    HEART: irregular , slightly tachycardic . Extremities perfused.   ABDOMEN: Soft,  nontender, and nondistended. Positive bowel sounds.   EXTREMITIES: No LE edema noted.   NEUROLOGIC: left sided weakness , more prominent in upper extremity 3/5 as compared to lower extremity, left facial droop , left side neglect , continues to have visual field deficit     Medications     - MEDICATION INSTRUCTIONS -       sodium chloride 30 mL/hr at 05/21/19 1640       aspirin  300 mg Rectal Daily     furosemide  10 mg Intravenous Daily     insulin aspart  1-4 Units Subcutaneous Q4H     metoprolol  5 mg Intravenous Q6H       Data   Recent Labs   Lab 05/22/19  0826 05/20/19  1543 05/20/19  0636 05/19/19  2348   WBC 7.4  --   --  5.7   HGB 11.9*  --   --  12.6*   *  --   --  105*     --   --  208   INR  --   --   --  1.19*   *  --   --  144   POTASSIUM 4.2  --   --  3.8   CHLORIDE 110*  --   --  107   CO2 29  --   --  31   BUN 29  --   --  28   CR 0.98  --   --  0.98   ANIONGAP 7  --   --  6   PRATEEK 8.5  --   --  8.7   *  --   --  121*   ALBUMIN  --   --  3.2*  --    PROTTOTAL  --   --  6.5*  --    BILITOTAL  --   --  0.7  --    ALKPHOS  --   --  81  --    ALT  --   --  36  --    AST  --   --  34  --    TROPI  --  0.095* 0.119* 0.121*       Imaging:   No results found for this or any previous visit (from the past 24 hour(s)).

## 2019-05-23 NOTE — PLAN OF CARE
R occipital stroke. Alert to self. VSS - HR tachy, 2L O2 NC. NPO - nectar thick if 1:1 w/ RN. Tele: afib + CVR. Denies pain - PRN tylenol available. A2 + lift. Turn/repo q2h. Kwon patent. L PIV 1/2NS @ 50ml/hr. Neuros inconsistent - L droop, bilateral weakness (more on L), left field cut/neglect. BG 98 & 93, Na 146, BNP 9,323. Scattered wounds throughout body - WOC. Possible video swallow study 5/23. Discharge pending.

## 2019-05-23 NOTE — PROGRESS NOTES
SW:  D: Pt is clinically accepted at [facility]. Discharge delayed due to video swallow test today.  P: [facility] updated and SW will continue to follow for choice in TCU with pt's wife.    MOOK Boyle  [facility]  Phone: [phone]

## 2019-05-23 NOTE — PLAN OF CARE
Discharge Planner SLP   Patient plan for discharge: Did not discuss  Current status: Pt seen for video swallow evaluation. He is alert, agreeable, and able to follow commands/participate.  Pt is notably short of breath, however RN stated prior to evaluation this has not been unusual for him. He was seated in an upright position and assessed with nectar thick liquid barium and puree barium. Pt's oral cavity is disorganized in bolus acceptance, likely d/t some confusion and incoordination. Reduced bolus formation with premature spillage to the pharynx with all trials. Difficulty with posterior propulsion across textures. Reduced tongue base retraction, impaired hyoid excursion and laryngeal elevation resulting in incomplete epiglottic closure. Reduced pharyngeal stripping wave also noted. Significant aspiration event with nectar thick liquids via straw sip during the swallow felt related to premature spillage. A spontaneous cough response occurred, however was ineffective in clearing or protecting pt's airway. Pt tolerated teaspoons and hand over hand controlled cup sip x 1 with SLP assistance without penetration or aspiration. Mild-mod amounts of residue, reducing with alternating textures/consistencies. No penetration or aspiration with puree, mild-moderate amounts of oral and pharyngeal residue.     Recommend full liquid diet with nectar thick liquids by spoon only. Pt must be seated fully upright, on his back as able, and PO intake only allowed when pt is fully alert with a stable respiratory status. Small bites/sips, should be provided, will need 1:1 feeding assistance and supervision, PO should be held with changes in respiratory status or with any indications of aspiration.     Barriers to return to prior living situation: Below baseline, medical condition, respiratory status, aspiration risk, nutrition  Recommendations for discharge: TCU  Rationale for recommendations: SLP at next level of care for management  of dysphagia          Entered by: Venice Callejas 05/23/2019 11:03 AM

## 2019-05-23 NOTE — PLAN OF CARE
Pt is disoriented to time place situation. Restless at times. Inconsistent at following commands. VSS on 2L. LS coarse/diminished. Tele A fib CVR + BBB. Neuros ex L sided weakness, facial droop, field cut and neglect. Slurred speech, at times does not respond. Wounds to L shoulder, BLEs, and coccyx with mepilex in place, CDI. BG checks Q4 hours, no insulin coverage needed this shift. A/2 + lift. Repositioning Q2 hours. Kwon patent. PIV inf IVF at slow rate for hydration. NPO, swallow study tomorrow. Contact precautions maintained. Continue to monitor.

## 2019-05-23 NOTE — PROGRESS NOTES
05/23/19 1045   General Information   Onset Date 05/19/19   Start of Care Date 05/23/19   Referring Physician Dr. Garrett   Patient Profile Review/OT: Additional Occupational Profile Info See Profile for full history and prior level of function   Patient/Family Goals Statement None stated, pt agreeable, consent recieved from family    Swallowing Evaluation Videofluoroscopic evaluation   Behaviorial Observations WFL (within functional limits);Lethargic  (short of breath )   Mode of current nutrition NPO   Respiratory Status O2 Supply   Type of O2 supply Nasal cannula   Comments Per MD note: Patient is 89-year-old male with past medical history significant for essential hypertension, spina bifida, basal cell carcinoma, osteoarthritis and recently undergoing evaluation for lower extremity edema and weeping wounds who presented to the emergency department last night as his wife noticed left-sided weakness and facial droop.  Code stroke was called and patient received TPA with some improvement noted in left extremity motor function.  Currently patient is monitored in ICU for post TPA cares.   VFSS Eval: Radiology   Radiologist Dr. Flores    Views Taken left lateral   Physical Location of Procedure [facility] Radiology Suite    VFSS Eval: Nectar Thick Liquid Texture Trial   Mode of Presentation, Nectar cup;spoon;straw;fed by clinician   Preparatory Phase Poor bolus control   Oral Phase, [place] Poor AP movement;Residue in oral cavity;Premature pharyngeal entry   Pharyngeal Phase, Nectar Delayed swallow reflex;Residue in valleculae;Residue in pyriform sinus   Rosenbek's Penetration Aspiration Scale: Nectar-Thick Liquid Trial Results 7 - contrast passes glottis, visible subglottic residue remains despite patient's response (aspiration)   Response to Aspiration, Nectar unproductive reflexive involuntary cough/throat clear   Successful Strategies Trialed During Procedure, Nectar   (Pt not appropriate for strategies  d/t resp statusand confusi)   Diagnostic Statement Pt tolerated teaspoons and [place] assisted cup sip of nectar thick liquids without penetration or aspiration. Significant, freida aspiration event occured with straw sip of nectar thick liquids. Pt demonstrated an overt cough response, however this was completely ineffective in clearing the aspirated material    VFSS Eval: Puree Solid Texture Trial   Mode of Presentation, Puree spoon;fed by clinician   Order of Presentation 1 teaspoon    Oral Phase, Puree Residue in oral cavity;Premature pharyngeal entry   Pharyngeal Phase, Puree WFL   Rosenbek's Penetration Aspiration Scale: Puree Food Trial Results 1 - no aspiration, contrast does not enter airway   Diagnostic Statement Pt tolerated puree without penetration or aspiration, mild-moderate amounts of pharyngeal and oral residue    Swallow Compensations   Swallow Compensations Alternate viscosity of consistencies;Pacing;Reduce amounts;Multiple swallow   Esophageal Phase of Swallow   Patient reports or presents with symptoms of esophageal dysphagia No   General Therapy Interventions   Planned Therapy Interventions Dysphagia Treatment   Dysphagia treatment Oropharyngeal exercise training;Modified diet education;Instruction of safe swallow strategies   Swallow Eval: Clinical Impressions   Skilled Criteria for Therapy Intervention Skilled criteria met.  Treatment indicated.   Dysphagia Outcome Severity Scale (CHAVO) Level 2 - CHAVO   Treatment Diagnosis Moderate-severe oropharyngeal dysphagia    Diet texture recommendations Full liquid;Nectar thick liquids  (by spoon )   Recommended Feeding/Eating Techniques alternate between small bites and sips of food/liquid;check mouth frequently for oral residue/pocketing;hard swallow w/ each bite or sip;maintain upright posture during/after eating for 30 mins;no straws;small sips/bites   Therapy Frequency 5 times/wk   Predicted Duration of Therapy Intervention (days/wks) 1 week    Anticipated Discharge Disposition extended care facility   Risks and Benefits of Treatment have been explained. Yes   Patient, family and/or staff in agreement with Plan of Care Yes   Clinical Impression Comments Pt seen for video swallo evaluation. He is alert, agreeable, and able to follow commands/participate.  Pt is notably short of breath, however RN stated prior to evaluation this has not been unusual for him. He was seated in an upright position and assessed with nectar thick liquid barium and puree barium. Pt's oral cavity is disorganized in bolus acceptance, likely d/t some confusion and incoordination. Reduced bolus formation with premature spillage to the pharynx with all trials. Difficulty with posterior propulsion across textures. Reduced tongue base retraction, impaired hyoid excursion and laryngeal elevation resulting in incomplete epiglottic closure. Reduced pharyngeal stripping wave also noted. Significant aspiration event with nectar thick liquids via straw sip during the swallow felt related to premature spillage. A spontaneous cough response occurred, however was ineffective in clearing or protecting pt's airway. Pt tolerated teaspoons and hand over hand controlled cup sip x 1 with SLP assistance without penetration or aspiration. Mild-mod amounts of residue, reducing with alternating textures/consistencies. No penetration or aspiration with puree, mild-moderate amounts of oral and pharyngeal residue. Recommend full liquid diet with nectar thick liquids by spoon only. Pt must be seated fully upright, on his back as able, and PO intake only allowed when pt is fully alert with a stable respiratory status. Small bites/sips, should be provided, will need 1:1 feeding assistance and supervision, PO should be held with changes in respiratory status or with any indications of aspiration.    Total Evaluation Time   Total Evaluation Time (Minutes) 18

## 2019-05-23 NOTE — PLAN OF CARE
Inconsistent with neuro exam.  Appears to have left facial droop, left field cut and neglect, BUE ataxia, & blurred vision.  Left side may be weaker than right but is generally weak.  States day and month of birth but perseverates on this when asked other questions. Z-flow pillow and chair cushion ordered, T&R q2h, multiple wounds covered with Mepelix.  Denies pain but winces when RLE elevated, VSS, tele A-fib with CVR/BBB.  Plan for video swallow tomorrow if patient more alert.

## 2019-05-23 NOTE — PLAN OF CARE
Discharge Planner PT   Patient plan for discharge: not stated  Current status:       Pt supine upon arrival, agreeable to therapy, family/friends present. Pt on 3L O2 desats to 88% with activiyt this day. Inc time for rm set up, supine > sit max a x 2 with use of liz. Total Ax 2 scooting towards EOB. Pt continues to demonstrate L lateral lean. Mari from therapist for proper BUE placement on bed. Pt required min A x 1 to maintain sitting balance. Cues for postural correction. Pt with limited tolerance to sitting EOB this PM, requesting to lay down after ~ 5 minutes. Total A x 2 to return to supine and for proper positioning in bed with proper pillow placement. RN informed of session. O2 sats > 90% upon exiting rm. ALarm on and family present all needs within reach     Barriers to return to prior living situation: A x 2, weakness, impaired sitting balance, unable to stand or ambulate, lift for transfers currently   Recommendations for discharge: TCU  Rationale for recommendations: Pt will benefit from continued skilled PT at TCU to improve strength, balance, endurance, mobility as pt far below baseline at this time          Entered by: Corrina Azevedo 05/23/2019 2:49 PM

## 2019-05-23 NOTE — PLAN OF CARE
Discharge Planner SLP   Patient plan for discharge: Did not discuss  Current status: Pt alert, fatigued but following commands and able to participate in tx session. No overt s/sx of aspiration on teaspoons of nectar thick liquids and small amount of puree solids. Will proceed with video swallow as pt appears appropriate. This is to be completed today at 1000.   Barriers to return to prior living situation: Below baseline, nutrition, aspiration, weakness  Recommendations for discharge: TCu  Rationale for recommendations: SLP at next level of care for management of dysphagia        Entered by: Venice Callejas 05/23/2019 9:44 AM

## 2019-05-23 NOTE — PROVIDER NOTIFICATION
"Call placed to Dr. Sandoval: \"Hospitalist would like to put patient AMMY in 709 on D5 1/2NS witih 20 KCL. BGM's have been 88 and 108 today.  Cleared for nectar diet but lethargic, is this ok with you as well? Please call Kristel QUIROZ [phone]\"    Received return call from Dr. Sandoval.  OK for above IVF.  "

## 2019-05-23 NOTE — PLAN OF CARE
Discharge Planner OT   Patient plan for discharge: none stated  Current status:  Pt requires max A x 2 supine <> sit, pt sat at EOB with max Ax 2 with L Lateral lean and cues to try to sit upright with decreased follow through. Pt sat EOB with mirror in front of Pt. Pt able to occasionally able look to the right and focus on object in mirror with max verbal cues. Pt sat EOB for ~10 min. Pt left arm occasionally tensed with abnormal tone and movements. Pt sit <> supine and repositioned w max Ax3.   Barriers to return to prior living situation: L sided weakness and incoordination. Decreased cognition with STM, decreased balance and possible L visual field cut, difficult to assess at this time.  Recommendations for discharge: TCU  Rationale for recommendations: Pt will require therapy to increase ADL and functional mobility independence and safety to PLOF. Pt with baseline mobility impairments due to spina bifida, will continue to monitor progress and frequency and tolerance of therapy.            Entered by: Victoria Cook 05/23/2019 12:58 PM

## 2019-05-23 NOTE — PROGRESS NOTES
Vascular Neurology Progress Note    ____________________________________________________________    Admission Summary  Pepito Hicks is an 89 year old male who presents with left VF deficit. CTP showed right occipital perfusion deficit and right P2 occlusion.  He was found to have Afib.     Last 24 hours:  Left sided VF deficit with left sided weakness. Stable. Dysphagia.     Impression:   Acute right occipital, bilateral hemisphere, left cerebellar ischemic stroke due to Afib  Chronic left occipital ischemic stroke  Dysphagia     Recommendations:  -MRI brain shows right multi-territory ischemic stroke with petechial hemorrhage in right occipital area  -Would wait 2 weeks prior to starting AC (Eliquis); Can start Aspirin now  -PT/OT  -Swallow terri Sandoval MD  Neurology     Please contact the stroke service with any questions: Feel free to call my cell phone     Yan Sandoval MD  Vascular Neurology  May 22, 2019    I personally reviewed all relevant labs and neuroimaging.  I spent 35 minutes reviewing labs, diagnostic studies, neuroimaging, and evaluating the patient.    ____________________________________________________________________        Medications:      Current Facility-Administered Medications:      acetaminophen (TYLENOL) tablet 650 mg, 650 mg, Oral, Q4H PRN **OR** acetaminophen (TYLENOL) Suppository 650 mg, 650 mg, Rectal, Q4H PRN, Trudi Song MD, 650 mg at 05/22/19 1816     albuterol (PROVENTIL) neb solution 2.5 mg, 2.5 mg, Nebulization, Q4H PRN, Trudi Song MD, 2.5 mg at 05/20/19 0444     aspirin (ASA) Suppository 300 mg, 300 mg, Rectal, Daily, Carmella Pope MD, 300 mg at 05/22/19 0951     glucose gel 15-30 g, 15-30 g, Oral, Q15 Min PRN **OR** dextrose 50 % injection 25-50 mL, 25-50 mL, Intravenous, Q15 Min PRN **OR** glucagon injection 1 mg, 1 mg, Subcutaneous, Q15 Min PRN, Trudi Song MD     furosemide (LASIX) injection 10 mg, 10 mg, Intravenous, Daily, Trudi Song MD, 10 mg at 05/22/19  "0951     hydrALAZINE (APRESOLINE) injection 10 mg, 10 mg, Intravenous, Q4H PRN, Trudi Song MD     insulin aspart (NovoLOG) inj (RAPID ACTING), 1-4 Units, Subcutaneous, Q4H, Trudi Song MD     labetalol (NORMODYNE/TRANDATE) syringe 10 mg, 10 mg, Intravenous, Q2H PRN, Trudi Song MD     metoprolol (LOPRESSOR) injection 5 mg, 5 mg, Intravenous, Q6H, Trudi Song MD, 5 mg at 05/22/19 2114     naloxone (NARCAN) injection 0.1-0.4 mg, 0.1-0.4 mg, Intravenous, Q2 Min PRN, Trudi Song MD     NO anticoagulation/antiplatelet medications or NSAID in first 24 hours after alteplase (ACTIVASE) administration or after thrombectomy, , Does not apply, Continuous PRN, Trudi Song MD     ondansetron (ZOFRAN-ODT) ODT tab 4 mg, 4 mg, Oral, Q6H PRN **OR** ondansetron (ZOFRAN) injection 4 mg, 4 mg, Intravenous, Q6H PRN, Trudi Song MD     sodium chloride 0.45% infusion, , Intravenous, Continuous, Trudi Song MD, Last Rate: 50 mL/hr at 05/22/19 1608    Vital Signs:  B/P: 131/79, T: 98.7, P: 106, R: 18    /79 (BP Location: Left arm)   Pulse 106   Temp 98.7  F (37.1  C) (Axillary)   Resp 18   Ht 1.651 m (5' 5\")   Wt 64 kg (141 lb 1.5 oz)   SpO2 100%   BMI 23.48 kg/m    General Appearance:   No acute distress, difficult to participate.   Cardiovascular: Regular rate and rhythm, no m/r/g  Lungs: CTAB, no wheezing, rhonci and crackles  Extremities: No clubbing, cyanosis, no rashes    Neuro:  Mental Status Exam:  Lethargic. Dysarthric.    Cranial Nerves:  Eyes track to right and middle. Unable to move them left. No left side visual field confirmed. Unable to confirm facial sensation on left face. Facial strength weak on left side. Shoulder shrug strong bilaterally. Tongue midline. Hard of hearing.   Motor:  5/5 strength RUE. 3/5 strength LUE.  strength 4/5 on right and 3/5 on left.  Unable to lift left leg in exam.       Sensory:  Intact to light touch on right arm and leg. Unable to confirm sensation on left arm " and leg.               Coordination:   FNF difficult to assess with pt visual field difficulties.   Gait:  Not assessed.     Labs/Studies:  CBC:     Recent Labs   Lab 05/22/19  0826 05/19/19  2348   WBC 7.4 5.7   RBC 3.58* 3.81*   HGB 11.9* 12.6*   HCT 38.0* 40.1    208     Basic Metabolic Panel:   Recent Labs   Lab Test 05/22/19  0826 05/19/19  2348 10/15/18  1542   * 144 137   POTASSIUM 4.2 3.8 4.3   CHLORIDE 110* 107 102   CO2 29 31 30   BUN 29 28 13   CR 0.98 0.98 0.86   * 121* 105*   PRATEEK 8.5 8.7 9.7     Liver panel:  Recent Labs   Lab Test 05/20/19  0636   PROTTOTAL 6.5*   ALBUMIN 3.2*   BILITOTAL 0.7   ALKPHOS 81   AST 34   ALT 36     INR:  Recent Labs   Lab Test 05/19/19  2348   INR 1.19*      Lipid Profile:  Recent Labs   Lab Test 05/20/19  0636 07/01/16  1110 01/14/15  0912 10/30/12  1105   CHOL 127 169 173 195   HDL 58 78 94 74   LDL 56 76 63 109   TRIG 63 75 78 62   CHOLHDLRATIO  --   --  1.8 2.7     A1C:   Recent Labs   Lab Test 05/20/19  0636   A1C 6.8*     Troponin I:   Recent Labs   Lab Test 05/20/19  1543 05/20/19  0636 05/19/19  2348   TROPI 0.095* 0.119* 0.121*         Imaging:  Relevant findings as per the Impression above.

## 2019-05-23 NOTE — PROGRESS NOTES
[facility]    HOSPITALIST PROGRESS NOTE :   --------------------------------------------------    Date of Admission:  5/19/2019    Cumulative Summary: Pepito Hicks is a 89 year old male with past medical history significant for essential hypertension, spina bifida, basal cell carcinoma, osteoarthritis and recently undergoing evaluation for lower extremity edema and weeping wounds who presented to the emergency department as his wife noticed left-sided weakness and facial droop.  Code stroke was called and patient received TPA with some improvement noted in left extremity motor function.Patient was admitted for further evaluation and management on may 19th .Patient is monitored in ICU for post TPA cares. He was also found to have new onset diabetes and possible ischemic cardiomyopathy.cardiology was also consulted , patient remains very frail , following commands some times other times slightly confused.patient has also been evaluated by speech, patient initially remained n.p.o. has undergone Video swallow study this morning. Patient is also recommended to be started on anticoagulation and neurology is recommending to wait 2 weeks from stroke.    Assessment & Plan     Active Problems:  Acute right occipital ischemic stroke due to A. Fib: Patient presented with left-sided weakness and facial droop, received TPA in the emergency room, maintaining his blood pressure has been n.p.o. after evaluation of his speech therapy and is receiving gentle fluids at 50 cc/h.  Patient has been evaluated by neurology.  CT scan of the head did not show any acute ischemia or hemorrhage did show old left occipital lobe infarct and old left thalamic lacunar infarct.  CT scan of the head with brain perfusion showed right posterior cerebral artery distribution transit time prolongation with preservation of cerebral blood volumes, suggesting at risk tissue.  CTA of head and neck showed right posterior cerebral  artery occlusion at the P2 segment background of the scattered atherosclerotic irregularity including high-grade stenosis of left posterior cerebral artery and right anterior cerebral artery segment were also noticed.  CT of the neck showed patent cervical vasculature with moderate plaquing at the carotid bifurcation without evidence of flow-limiting stenosis.  At this time neurology is recommending to hold any anticoagulation in place for 24 hours post TPA.  MRI of the brain was done, showing multiple recent ischemic infarcts involving the cerebral hemispheres bilaterally and left cerebellar hemisphere including a large right PCA territory infarct.  Given infarcts in multiple vascular territories, embolic infarcts from a central embolic source should be considered.  Patient was sitting today in the bed and was trying to work with therapies.  Chronic left occipital ischemic stroke;    --Continue to monitor patient closely on telemetry.  --Patient underwent video swallow evaluation this morning, and is recommended to be started on full liquid diet with nectar thick liquids by spoon only.  Patient must be sitting fully upright, on his back as able n.p.o. intake only an hour when patient is fully alert with a stable respiratory status.  Small bites/sips should be provided.  Will need one-to-one feeding assistance and supervision, p.o. should be held with changes in respiratory status or with any indications of aspiration.  --As patient continues to have poor oral intake although diet is ordered per nursing will be assisting if it is safe to start patient on some diet, will change his fluids to D5 half normal saline with potassium at 50 cc/h with the plan to stop the fluids tomorrow morning.  --Discussed with neurology if they are okay with D5 infusion.  --Continue Kwon catheter care as per protocol, continue strict intake and output.  -- Will change his Lasix to 10 mg twice daily.  -- MRI results were reviewed as  above.  --Physical and Occupational Therapy is recommending transitional care unit.  --Plan to start patient on Flomax once he is able to take oral medications.  -- Patient has high [id number] score , neurology is recommending to start anticoagulations in 2 weeks   -- Long discussion with wife and daughter was done 2 days ago regarding patient clinical status , they confirmed his DNR status and his wishes regarding no tube feedings.    Acute systolic congestive heart failure with possible ischemic cardiomyopathy with ejection fraction of 30 to 35%: No known history of congestive heart failure or coronary artery disease, echo is significantly abnormal for moderate to severe global hypokinesia of left ventricle, decreased right ventricular systolic function and moderate biatrial enlargement.  Patient also have valvular abnormalities considering moderate 2+ MR, 1+ AR and mild to moderate valvular aortic stenosis in the presence of pulmonary hypertension.  Cardiology was consulted, appreciate their help they are concerned for possible ischemic cardiomyopathy and has started patient on very low-dose IV Lopressor considering patient is n.p.o.  New onset atrial fibrillation: Most likely etiology of his stroke, considering his multiple previous strokes, most likely cardioembolic phenomena might have played a role  Possible Peripheral arterial disease : right leg is colder as compared to left with bilateral lower extremities ischemic ulceration   Hypernatremia : most likely sec to depleted intravascular volume as patient being NPO.  Low Urine out out: poor urine out put sec to low oral intake , patient had only 750 cc urine output yesterday, so far patient has 250 mL out.    -- continue to monitor patient closely.  --We will discontinue IV Lopressor and start patient on metoprolol 25 mg p.o. twice daily.  -- As patient has very poor oral intake resulting into hyponatremia will start patient on D5 , but will go ahead and  increase his Lasix to 10 mg every 12 hours as patient also seems to be slightly fluid overloaded intravascularly.  -- Plan to stop fluids tomorrow.  -- Neurology is ok to start patient on anticoagulation in 2 weeks  -- wound care for lower extremities wound , patient seems to have more than 6-7 wounds in different areas of his body, wound care has been posted.  -- will also recommend out patient PVD workup once patient is not acutely sick.    New onset diabetes: Hemoglobin A1c came back 6.8, patient does not have known history of diabetes mellitus, probably will benefit from getting starting on oral hypoglycemic agents at discharge.    --Continue patient on low-dose sliding scale insulin  --Checking blood sugars as per NPO protocol.      Diet: Patient is started on combination diet full liquid, nectar thickened liquids, pre-thickened or use instant food thickener by spoon.  Also use one-to-one assistance and supervision.  Kwon Catheter: in place, indication: Retention  DVT Prophylaxis: Pneumatic Compression Devices and Anti-embolisim stockings (TEDs)  Code Status: DNR    The patient's care was discussed with the Bedside Nurse and Patient.    Disposition Plan   Expected discharge: Over the weekend to transitional care unit, patient is able to tolerate some diet and his beta-blockers are adjusted to control his tachycardia.  I am concerned about his overall clinical improvement considering multiple comorbidities, multiple areas of infarct on the MRI, new onset cardiomyopathy, diabetes mellitus, peripheral vascular disease , multiple wounds and patient being frail and confused.  No family members were present at the time of my evaluation, will Update them via phone later.    Left the message at wife,s answering machine as per their request , was not able to get hold of daughter via phone either , will try to contact them again tomorrow     Entered: Trudi Song MD 05/23/2019, 12:16 PM    Trudi Song MD, FACP  Text  Page (7am - 6pm)    ----------------------------------------------------------------------------------------------------------------------    Interval History    Patient was seen and examined this morning, sitting in the bed with the help of therapies, bedside nursing is also changing wound care dressing.  Patient does not remember seeing me for the past 3 days, patient is denying any pain, bedside nursing remains concerned about feeding patient considering his on and off lethargy and increased risk for aspiration.    -Data reviewed today: I reviewed all new labs and imaging results over the last 24 hours.    I personally reviewed no images or EKG's today.    Physical Exam   Temp: 97.6  F (36.4  C) Temp src: Axillary BP: 138/84   Heart Rate: 119 Resp: 18 SpO2: 92 % O2 Device: Nasal cannula Oxygen Delivery: 2 LPM  Vitals:    05/19/19 2346 05/20/19 0200 05/21/19 0400   Weight: 62.9 kg (138 lb 10.7 oz) 65.1 kg (143 lb 8.3 oz) 64 kg (141 lb 1.5 oz)     Vital Signs with Ranges  Temp:  [97.6  F (36.4  C)-98.9  F (37.2  C)] 97.6  F (36.4  C)  Heart Rate:  [102-119] 119  Resp:  [18] 18  BP: (125-139)/(73-91) 138/84  SpO2:  [92 %-100 %] 92 %  I/O last 3 completed shifts:  In: 550 [I.V.:550]  Out: 900 [Urine:900]    GENERAL: Alert , awake and oriented. Frail looking, elderly gentleman NAD, slightly slurred speech   HEENT: Normocephalic. EOMI. No icterus or injection. Nares normal.   LUNGS: Clear to auscultation. No dyspnea at rest.does have prolonged exp phase pattern in between breaths    HEART: irregular , slightly tachycardic . Extremities perfused.   ABDOMEN: Soft, nontender, and nondistended. Positive bowel sounds.   EXTREMITIES: No LE edema noted.   NEUROLOGIC: left sided weakness , more prominent in upper extremity 3/5 as compared to lower extremity, left facial droop , left side neglect , continues to have visual field deficit     Medications     - MEDICATION INSTRUCTIONS -       NaCl 50 mL/hr at 05/23/19 5651        aspirin  300 mg Rectal Daily     famotidine  20 mg Intravenous Q12H BMT CSA     furosemide  10 mg Intravenous Daily     insulin aspart  1-4 Units Subcutaneous Q4H     metoprolol  5 mg Intravenous Q6H       Data   Recent Labs   Lab 05/23/19  0855 05/22/19  0826 05/20/19  1543 05/20/19  0636 05/19/19  2348   WBC  --  7.4  --   --  5.7   HGB  --  11.9*  --   --  12.6*   MCV  --  106*  --   --  105*   PLT  --  203  --   --  208   INR  --   --   --   --  1.19*   * 146*  --   --  144   POTASSIUM  --  4.2  --   --  3.8   CHLORIDE  --  110*  --   --  107   CO2  --  29  --   --  31   BUN  --  29  --   --  28   CR 0.82 0.98  --   --  0.98   ANIONGAP  --  7  --   --  6   PRATEEK  --  8.5  --   --  8.7   GLC  --  121*  --   --  121*   ALBUMIN  --   --   --  3.2*  --    PROTTOTAL  --   --   --  6.5*  --    BILITOTAL  --   --   --  0.7  --    ALKPHOS  --   --   --  81  --    ALT  --   --   --  36  --    AST  --   --   --  34  --    TROPI  --   --  0.095* 0.119* 0.121*       Imaging:   No results found for this or any previous visit (from the past 24 hour(s)).

## 2019-05-23 NOTE — PLAN OF CARE
Neuro exam exhibits LUE/LLE hemiparesis/neglect/numbness, left visual neglect and field cut, absent gaze, blurred vision, slurred speech and disorientation.  VSS, except tachycardic (A-fib with RVR), irregular respiratory depth, improved after IV LAsix. All wounds cleansed and re-dressed this shift.  Dangled at SOB with therapy x2, Kwon patent with 550cc output, advanced to full nectar thick liquid diet, 1:1 supervision, pills crushed in applesauce. Attempted to wean off O2 88% on RA, 100% on 2LO2.  T&R q2h with Z-flow pillow, pulsate mattress ordered.  Discharge plan pending.

## 2019-05-23 NOTE — PROVIDER NOTIFICATION
"Call placed to Dr. Song: \"Patient is reporting heartburn. Could we get some IV Zantac, etc? Thanks!\"  "

## 2019-05-23 NOTE — PROGRESS NOTES
[facility] Nurse Inpatient Wound Assessment     Follow-up Assessment of wound(s) on pt's:   BL IT, BL Lower extremites, L hand        Data:   Patient History:      per MD note(s): Pepito Hicks is a 89 year old male with history of hypertension, spina bifida, basal cell carcinoma, osteoarthritis and recent evaluation for lower extremity edema and weeping wounds who presented to the ED his wife with complaints of left-sided weakness and facial droop.  Code stroke was called and he received TPA with some improvement already noted in left extremity motor function. He is admitted to ICU for post-tpa cares.        Daniel Risk Assessment  Sensory Perception: 2-->very limited    Moisture: 3-->occasionally moist   Activity: 1-->bedfast     Mobility: 2-->very limited   Nutrition: 2-->probably inadequate   Daniel Score: 11      Positioning: Pillows    Mattress:  Standard , Low air loss mattress - Pulsate ordered today 5-23    Moisture Management:  Incontinence Protocol and Diaper    Catheter secured? Not applicable    Current Diet / Nutrition:     Orders Placed This Encounter      Combination Diet Full Liquid Diet; Nectar Thickened Liquids (pre-thickened or use instant food thickener) (By spoon)      Labs:   Recent Labs   Lab Test 05/22/19  0826 05/20/19  0636 05/19/19  2348   ALBUMIN  --  3.2*  --    HGB 11.9*  --  12.6*   INR  --   --  1.19*   WBC 7.4  --  5.7   A1C  --  6.8*  --        Wound Assessment (location):   BL Lower extremities  Wound History:  Per spouse pt has been having increased weakness and lower extremity edema with blisters   Right upper shin: 2.5cm x 2cm roofed blister filled with serous fluid  Right lower shin 7cm x 5cm  roofed blister filled with serous fluid  Right Posterior Calf 7cm x 7cm x 0.1 unroofed blister with pink moist dermis draining mod amt serous drainage  Left shin 6.1cm x 6.6cm x 0.1 unroofed blister with pink moist dermis draining mod amt serous  drainage  Left Calf 1.8cm x 2.7cm x 0.1 unroofed blister with pink moist dermis draining mod amt serous drainage    5-23-19 RLE      5-23-19 LLE        Wound Assessment (location):   Left hand (from 5-20; not assessed today 5-23)  Wound History:  Pt with left side weakness and hand bumped causing skin tear   Partial ecchymotic epidermal flap able to be approximated 100%, purple maroon erythema, draining scant serosanguinous.     Wound Assessment (location):   BL IT  Wound History: Unknown history of wounds, present on admission    Wound Base: 100% adherent slough     Specific Dimensions (length x width x depth, in cm) :   Right IT 0.6cm x 0.9cm x 0.2cm                                                                                            Left IT proximal 1.0cm x 1.2cm x 0.2cm, distal 0.5cm x 0.5cm x 0.2cm    Tunneling:  N/A    Undermining: N/A    Palpation of the wound bed:  fluctuance    Slough appearance:  adherent and stringy    Eschar appearance:  none    Periwound Skin: intact, coccyx intact    Color: normal and consistent with surrounding tissue    Temperature  normal     Drainage:  Creamy tan     Odor: none    Pain:  absent     5-23-19 buttocks              Intervention:     Patient's chart evaluated.      Wound(s) assessed    Wound Care completed per POC    Orders  Written    Supplies  placed at the bedside     Ordered air mattress    Discussed plan of care with Nurse, family, pt          Assessment:          Pt has large roofed and unroofed blisters to BL lower extremities caused by increased edema. No s/s infection, all look very shallow and clean.  Large saggy blister remains intact to RLE but may rupture, which is ok, looks shallow underneath.  Will decrease BLE dressings to every other day.           Pt has unstageable pressure injuries to bilateral ITs, will continue topical Iodosorb to help clean up these wounds. Wounds were present on admission.  No acute signs or symptoms of infection.  Pt can  "be very reluctant to turn; today was briefly very combative when trying to turn pt for assessment, swinging his arms and hitting staff.  Will order air mattress to help offload pressure.            Left hand skin tear caused by trauma. No acute signs or symptoms of infection        Plan:     Nursing to notify the Provider(s) and re-consult the [facility] Nurse if wound(s) deteriorate(s) or if the wound care plan needs reevaluation.      Wound care to BLE: every other day, and prn if saturated:  1. Cleanse with Microklenz and blot dry  2. Apply vaseline gauze to open wound beds  3. Cover with absorbent dressing (gauze or foam)  4. Secure with Kerlix and tape  5. Time/Date/Initial dressing change  *Leave RLE blister intact unless it ruptures on its own, then can proceed with same cares as above*      Plan of care for wound located on: Left hand:  Every other day and prn:   1. Cleanse with microklenz and blot dry  2. Apply vaseline guaze to wound bed  3. Cover with Mepilex Border Dressing (#[id number])   4. Label dressing to show which way to pull off to maintain skin flap integrity.   5. Time/Date/Initial Dressing change      Plan of care for wound located on: BL IT and Coccyx  1. Clean wound with saline or MicKlenz Spray, pat dry  2. Paint with No Sting Skin Prep (#[id number])) and allow to dry thoroughly  3. Apply small dab of Iodosorb Gel (#[id number]) to wound bed only of BL IT  4. Press a Mepilex  Border Dressing (#[id number]) to BL IT and a Sacral Dressing (PS#[id number]) to coccyx for prevention, making sure to conform nicely to skin curvatures   (begin placing the Mepilex at the most distal aspect first, smooth into place in an upward direction, then smooth side to side)   5. Time and date dressing change and kyle with a \"T\" for treatment of a wound  6. Reposition pt every 1 to 2 hours when in bed and hourly when up to the chair to relieve pressure and promote perfusion to tissue  NOTE:  Iodosorb Gel should not be used longer than 14 " days. After 14 days the gel should be stopped and a new plan established, this may mean only a simple, gauze dressing.  The Iodosorb Gel should be stopped after use on Shraddha 3 .     [facility] Nurse will return: Weekly

## 2019-05-24 NOTE — PROGRESS NOTES
Vascular Neurology Progress Note    ____________________________________________________________    Admission Summary  Pepito Hicks is an 89 year old male who presents with left VF deficit. CTP showed right occipital perfusion deficit and right P2 occlusion.  He was found to have Afib.     Last 24 hours:  Left sided VF deficit with left sided weakness 2-3/5 depending on effort. Stable. Dysphagia.     Impression:   Acute right occipital, bilateral hemisphere, left cerebellar ischemic stroke due to Afib  Chronic left occipital ischemic stroke  Dysphagia     Recommendations:  -MRI brain shows right multi-territory ischemic stroke with petechial hemorrhage in right occipital area  -Would wait 2 weeks prior to starting AC (Eliquis); Cont Aspirin  -PT/OT  -Continue with speech/swallow evals  -Will sign off  -Follow up with [facility] 4-6 weeks    Yan Sandoval MD  Neurology     Please contact the stroke service with any questions: Feel free to call my cell phone     Yan Sandoval MD  Vascular Neurology  May 23, 2019    I personally reviewed all relevant labs and neuroimaging.  I spent 35 minutes reviewing labs, diagnostic studies, neuroimaging, and evaluating the patient.    ____________________________________________________________________        Medications:      Current Facility-Administered Medications:      acetaminophen (TYLENOL) tablet 650 mg, 650 mg, Oral, Q4H PRN **OR** acetaminophen (TYLENOL) Suppository 650 mg, 650 mg, Rectal, Q4H PRN, Trudi Song MD, 650 mg at 05/23/19 1455     albuterol (PROVENTIL) neb solution 2.5 mg, 2.5 mg, Nebulization, Q4H PRN, Trudi Song MD, 2.5 mg at 05/20/19 0444     aspirin (ASA) Suppository 300 mg, 300 mg, Rectal, Daily, Carmella Pope MD, 300 mg at 05/23/19 0908     dextrose 5% and 0.45% NaCl + KCl 20 mEq/L infusion, , Intravenous, Continuous, Trudi Song MD, Last Rate: 50 mL/hr at 05/23/19 1344     glucose gel 15-30 g, 15-30 g, Oral, Q15 Min PRN **OR** dextrose 50 % injection 25-50 mL,  "25-50 mL, Intravenous, Q15 Min PRN **OR** glucagon injection 1 mg, 1 mg, Subcutaneous, Q15 Min PRN, Trudi Song MD     famotidine (PEPCID) injection 20 mg, 20 mg, Intravenous, Q12H BMT CSA, Trudi Song MD, 20 mg at 05/23/19 2105     furosemide (LASIX) injection 10 mg, 10 mg, Intravenous, Q12H, Trudi Song MD, 10 mg at 05/23/19 2058     hydrALAZINE (APRESOLINE) injection 10 mg, 10 mg, Intravenous, Q4H PRN, Trudi Song MD     insulin aspart (NovoLOG) inj (RAPID ACTING), 1-4 Units, Subcutaneous, Q4H, Trudi Song MD     labetalol (NORMODYNE/TRANDATE) syringe 10 mg, 10 mg, Intravenous, Q2H PRN, Trudi Song MD     metoprolol tartrate (LOPRESSOR) tablet 25 mg, 25 mg, Oral, BID, Trudi Song MD, 25 mg at 05/23/19 2044     naloxone (NARCAN) injection 0.1-0.4 mg, 0.1-0.4 mg, Intravenous, Q2 Min PRN, Trudi Song MD     NO anticoagulation/antiplatelet medications or NSAID in first 24 hours after alteplase (ACTIVASE) administration or after thrombectomy, , Does not apply, Continuous PRN, Trudi Song MD     ondansetron (ZOFRAN-ODT) ODT tab 4 mg, 4 mg, Oral, Q6H PRN **OR** ondansetron (ZOFRAN) injection 4 mg, 4 mg, Intravenous, Q6H PRN, Trudi Song MD    Vital Signs:  B/P: 131/79, T: 98.7, P: 106, R: 18    /77 (BP Location: Right arm)   Pulse 106   Temp 97.8  F (36.6  C) (Axillary)   Resp 16   Ht 1.651 m (5' 5\")   Wt 64 kg (141 lb 1.5 oz)   SpO2 100%   BMI 23.48 kg/m    General Appearance:   No acute distress, difficult to participate.   Cardiovascular: Regular rate and rhythm, no m/r/g  Lungs: CTAB, no wheezing, rhonci and crackles  Extremities: No clubbing, cyanosis, no rashes    Neuro:  Mental Status Exam:  Lethargic. Dysarthric.    Cranial Nerves:  EOMI. Left VF deficit.  Facial strength weak on left side. Shoulder shrug strong bilaterally. Tongue midline. Hard of hearing.   Motor:  5/5 strength RUE. 3/5 strength LUE. Left leg 2-3/5 depending on effort.       Sensory:  Intact to light touch on " right arm and leg. Unable to confirm sensation on left arm and leg.               Coordination:   FNF difficult to assess with pt visual field difficulties.   Gait:  Not assessed.     Labs/Studies:  CBC:     Recent Labs   Lab 05/22/19  0826 05/19/19  2348   WBC 7.4 5.7   RBC 3.58* 3.81*   HGB 11.9* 12.6*   HCT 38.0* 40.1    208     Basic Metabolic Panel:   Recent Labs   Lab Test 05/23/19  0855 05/22/19  0826 05/19/19  2348 10/15/18  1542   * 146* 144 137   POTASSIUM  --  4.2 3.8 4.3   CHLORIDE  --  110* 107 102   CO2  --  29 31 30   BUN  --  29 28 13   CR 0.82 0.98 0.98 0.86   GLC  --  121* 121* 105*   PRATEEK  --  8.5 8.7 9.7     Liver panel:  Recent Labs   Lab Test 05/20/19  0636   PROTTOTAL 6.5*   ALBUMIN 3.2*   BILITOTAL 0.7   ALKPHOS 81   AST 34   ALT 36     INR:  Recent Labs   Lab Test 05/19/19  2348   INR 1.19*      Lipid Profile:  Recent Labs   Lab Test 05/20/19  0636 07/01/16  1110 01/14/15  0912 10/30/12  1105   CHOL 127 169 173 195   HDL 58 78 94 74   LDL 56 76 63 109   TRIG 63 75 78 62   CHOLHDLRATIO  --   --  1.8 2.7     A1C:   Recent Labs   Lab Test 05/20/19  0636   A1C 6.8*     Troponin I:   Recent Labs   Lab Test 05/20/19  1543 05/20/19  0636 05/19/19  2348   TROPI 0.095* 0.119* 0.121*         Imaging:  Relevant findings as per the Impression above.

## 2019-05-24 NOTE — PLAN OF CARE
PT: attempted to see pt for PT. Pt declining all attempts at supine/EOB/OOB activity at this time citing fatigue and pain. Pt's family reports they have been here most of the day and feel pt may be fatigued from that.

## 2019-05-24 NOTE — PROGRESS NOTES
Jase Progress Note  Chart Reviewed, Pt discussed in Interdisciplinary Rounds.   Pt had been accepted for admission at [facility] and [facility].    Intervention:   JASE called [facility] to update that pt is not ready for discharge today. Samia at [facility] states that they are not able   To hold a bed for pt but will reassess if needed at later time  JASE spoke with hospitalist who has been with pt and pt's daughter today to discuss hospice care. Pt and family have discussed  Feeding tube previously and determined that they do not want feeding tube.   Pt continues to show physical decline and hospice is a more appropriate placement at this time.   JASE contacted [facility] for update Nesha in admissions and she stated that they are declining pt for TCU admit. JASE inquired about a hospice  Bed and she states that they anticipate a bed over the weekend for pt if hospice care. Pt will need to be enrolled in hospice at time of admission.  Dina will be covering admissions over the weekend.    JASE met with pt's daughter Brenda to follow up and confirm interest in hospice care. She has requested to meet with  Hospice tomorrow midday so  She can bring her mother to meeting also. JASE made referral to Christie with  Hospice and an appointment is arranged for 1:00pm tomorrow.  Pt has a son that live in [place]. His name is Willian. Brenda will deep him updated with pt's status.    Daughter, Brenda indicates that her mother does not drive and that her father states that she tends to rely on him a great deal for direction.  Pt's wife does not have a dementia diagnosis but it is suspected and ishe tends to isolate and have difficulty making decisions and is in denial of pt's declining   Health. Pt's daughter, Brenda states that she believes her mother has an ACD but not her father. Justina states that her father has indicated previously that he does not   Want extraordinary efforts made to extend his life and that he has probably been around this long  due to his wanting to care for his wife.    JASE updated Brenda of Hospice meeting time and phoned pt's wife Dolly to provide update of appointment and of discussion with hospitalist, Yolis and She hess regarding  Pt's condition. Dolly is agreeable to meeting tomorrow and has no further questions of JASE at this time     Team Members notified:   Hospitalist web paged with update. CTRN,RN,    Plan: Pt and family meeting with  Hospice RN Saturday at 1:00pm.  Anticipated discharge placement: She on hospice     MOOK Boyle  [facility]  Phone: [phone]

## 2019-05-24 NOTE — PLAN OF CARE
Discharge Planner OT   Patient plan for discharge: none stated  Current status:  pt max A of 2 supine to/from sit EOB, max A to stablize sitting balance at EOB and BUE on table for support. Pt then able to maintain static sitting balance, when incorporating RUE into ADL task pt has LOB to left and required mod A for sitting and hand over hand assist to complete hygiene task. Pt follow ~ 50% of directions. Pt orientated to self only.   Barriers to return to prior living situation: L sided weakness and incoordination. Decreased cognition with STM, decreased balance and possible L visual field cut, difficult to assess at this time.  Recommendations for discharge: TCU per plan established by the Occupational Therapist  Rationale for recommendations: Pt will require therapy to increase ADL and functional mobility independence and safety to PLOF. Pt with baseline mobility impairments due to spina bifida, will continue to monitor progress and frequency and tolerance of therapy.             Entered by: Jenna Pickens 05/24/2019 11:08 AM

## 2019-05-24 NOTE — PLAN OF CARE
Discharge Planner SLP   Patient plan for discharge: Did not state  Current status: Nectar thick water by teaspoon and 4oz of magic cup trialed. Mod to max cues required to complete double swallows and supraglottic swallow every 5-6 bites. Pt was able to follow commands for these strategies and no overt s/sx of aspiration noted during the trial. Pt quickly fatigued.     Recommend full liquid diet with nectar thick liquids by spoon only with RN or pt's daughter/sister (trained in swallowing strategies). Pt must be seated fully upright, on his back as able, and PO intake only allowed when pt is fully alert with a stable respiratory status. Small bites/sips, should be provided, will need 1:1 feeding assistance and supervision, PO should be held with changes in respiratory status or with any indications of aspiration.     Barriers to return to prior living situation: Dysphagia  Recommendations for discharge: TCU  Rationale for recommendations: Continue SLP for dysphagia management       Entered by: Macarena Linder 05/24/2019 12:30 PM

## 2019-05-24 NOTE — PROGRESS NOTES
[facility]    HOSPITALIST PROGRESS NOTE :   --------------------------------------------------    Date of Admission:  5/19/2019    Cumulative Summary: Pepito Hicks is a 89 year old male with past medical history significant for essential hypertension, spina bifida, basal cell carcinoma, osteoarthritis and recently undergoing evaluation for lower extremity edema and weeping wounds who presented to the emergency department as his wife noticed left-sided weakness and facial droop.  Code stroke was called and patient received TPA with some improvement noted in left extremity motor function.Patient was admitted for further evaluation and management on may 19th .Patient is monitored in ICU for post TPA cares. He was also found to have new onset diabetes and possible ischemic cardiomyopathy.cardiology was also consulted , patient remains very frail , following commands some times other times slightly confused.patient has also been evaluated by speech, patient initially remained n.p.o. has undergone Video swallow study yesterday morning and now has been a started on dysphagia diet.Patient is also recommended to be started on anticoagulation and neurology is recommending to wait 2 weeks from stroke. Patient continues to have very poor overall prognosis due to significant comorbidities, debilitation, frail status and clinically doing poorly.  Discharged    Assessment & Plan     Active Problems:  Acute right occipital ischemic stroke due to A. Fib: Patient presented with left-sided weakness and facial droop, received TPA in the emergency room, maintaining his blood pressure has been n.p.o. after evaluation of his speech therapy and is receiving gentle fluids at 50 cc/h.  Patient has been evaluated by neurology.  CT scan of the head did not show any acute ischemia or hemorrhage did show old left occipital lobe infarct and old left thalamic lacunar infarct.  CT scan of the head with brain perfusion  showed right posterior cerebral artery distribution transit time prolongation with preservation of cerebral blood volumes, suggesting at risk tissue.  CTA of head and neck showed right posterior cerebral artery occlusion at the P2 segment background of the scattered atherosclerotic irregularity including high-grade stenosis of left posterior cerebral artery and right anterior cerebral artery segment were also noticed.  CT of the neck showed patent cervical vasculature with moderate plaquing at the carotid bifurcation without evidence of flow-limiting stenosis.  At this time neurology is recommending to hold any anticoagulation in place for 24 hours post TPA.  MRI of the brain was done, showing multiple recent ischemic infarcts involving the cerebral hemispheres bilaterally and left cerebellar hemisphere including a large right PCA territory infarct.  Given infarcts in multiple vascular territories, embolic infarcts from a central embolic source should be considered.  Patient was sitting today in the bed and was trying to work with therapies.  Chronic left occipital ischemic stroke; most likely secondary to underlying atrial fibrillation    --Continue to monitor patient closely on telemetry.  --Patient underwent video swallow evaluation yesterday morning, and is started on full liquid diet with nectar thick liquids by spoon only.  Patient must be sitting fully upright, on his back as able n.p.o. intake only an hour when patient is fully alert with a stable respiratory status.  Small bites/sips should be provided.  Will need one-to-one feeding assistance and supervision, p.o. should be held with changes in respiratory status or with any indications of aspiration.  --Discussed with bedside RN they were able to give patient some spoons of nectar thickened water, will discontinue IV fluids as his urine output has improved slightly.  --Patient is receiving only 10 mg of IV Lasix as per cardiology recommendation due to  concern for underlying congestive heart failure, will change it to oral Lasix.  --Continue patient on oral metoprolol.  --Patient was again evaluated by speech therapy this morning, although patient is a started on nectar thickened diet there is a still concern for very high risk for aspiration concerning patient's severe debilitation and mental status.  --Patient's daughter and sister was also present in the room, and he did have long conversation regarding my concern for his poor ability to participate in rehab and his high risk for readmission from any complications including congestive heart failure exacerbation, atrial fibrillation with RVR, peripheral vascular disease, multiple wounds which are at risk of infection and recurrent his stroke.  Goals of care discussion was done with patient's family if they would prefer patient to be discharged to facility with hospice care considering his clinical status.  They will be discussing it further with patient wife and will let me know later if they would like me to post hospice care.  At this time they are concerned that patient elderly wife would not be able to take care of him at home with hospice and patient will benefit from going to facility.  Neurology has signed off.  Patient was also evaluated by cardiology during this admission who have recommended very small dose of beta-blocker, aspirin and low-dose Lasix but are recommending conservative management considering his frail status.  --Plan to start patient on Flomax as he is able to take oral medications.  -- Patient has high [id number] score , neurology is recommending to start anticoagulations in 2 weeks if family decides to undergo rehab plan without hospice   -- Long discussion with wife and daughter was done on admission regarding patient clinical status , they confirmed his DNR status and his wishes regarding no tube feedings.    Acute systolic congestive heart failure with possible ischemic  cardiomyopathy with ejection fraction of 30 to 35%: No known history of congestive heart failure or coronary artery disease, echo is significantly abnormal for moderate to severe global hypokinesia of left ventricle, decreased right ventricular systolic function and moderate biatrial enlargement.  Patient also have valvular abnormalities considering moderate 2+ MR, 1+ AR and mild to moderate valvular aortic stenosis in the presence of pulmonary hypertension.  Cardiology was consulted, appreciate their help they are concerned for possible ischemic cardiomyopathy and has started patient on very low-dose IV Lopressor considering patient is n.p.o, now changed to oral and small dose Lasix due to patient poor oral intake.  New onset atrial fibrillation: Most likely etiology of his stroke, considering his multiple previous strokes, most likely cardioembolic phenomena might have played a role  Possible Peripheral arterial disease : right leg is colder as compared to left with bilateral lower extremities ischemic ulceration   Hypernatremia : most likely sec to depleted intravascular volume as patient was NPO for first 4 days and now is started on diet   Low Urine out out: improved once patient received D5 yesterday due to being NPO for last 4 days     -- continue to monitor patient closely.  -- Off IV Lopressor and started patient on metoprolol 25 mg p.o. twice daily.  -- Stop D5 as patient is able to take some thickened water today   -- Neurology is ok to start patient on anticoagulation in 2 weeks  -- wound care for lower extremities wound , patient seems to have more than 6-7 wounds in different areas of his body, wound care has been posted.  -- will also recommend out patient PVD workup once patient is not acutely sick.    New onset diabetes: Hemoglobin A1c came back 6.8, patient does not have known history of diabetes mellitus, probably will benefit from getting starting on oral hypoglycemic agents at  discharge.    --Continue patient on low-dose sliding scale insulin  -- change accu checks to QAC and HS as started on diet     Diet: Patient is started on combination diet full liquid, nectar thickened liquids, pre-thickened or use instant food thickener by spoon.  Also use one-to-one assistance and supervision.  Kwon Catheter: in place, indication: Retention  DVT Prophylaxis: Pneumatic Compression Devices and Anti-embolisim stockings (TEDs)  Code Status: DNR, ongoing discussion with patient,s family regarding goals of care , see above     The patient's care was discussed with the Bedside Nurse and Patient.    Disposition Plan   Expected discharge: Long discussion with daughter and patient's sister this morning regarding disposition planning, family is thinking about hospice care on discharge, I have updated care coordinators who will further helping with the discharge.  Entered: Trudi Song MD 05/24/2019, 12:38 PM    Trudi Song MD, FACP  Text Page (7am - 6pm)    ----------------------------------------------------------------------------------------------------------------------    Interval History  Patient was seen and examined this morning, sitting in bed, opens his eyes sometimes, remains pleasantly confused, does not follow all the commands.  Daughter and sister also present in the room, his wife is not present at this point.  Bedside RN was able to give him a few tablespoons of thickened water, we discussed about disposition planning in his clinical status and concern for him doing poorly after the discharge as per my discussion as above.  Family is planning to have further discussion with patient's wife and will be getting back to me if they would like patient to be discharged with hospice versus acute rehab.    -Data reviewed today: I reviewed all new labs and imaging results over the last 24 hours.    I personally reviewed no images or EKG's today.    Physical Exam   Temp: 98  F (36.7  C) Temp src: Oral  BP: 113/75   Heart Rate: 101 Resp: 20 SpO2: 98 % O2 Device: Nasal cannula Oxygen Delivery: 1.5 LPM  Vitals:    05/19/19 2346 05/20/19 0200 05/21/19 0400   Weight: 62.9 kg (138 lb 10.7 oz) 65.1 kg (143 lb 8.3 oz) 64 kg (141 lb 1.5 oz)     Vital Signs with Ranges  Temp:  [97.4  F (36.3  C)-98.1  F (36.7  C)] 98  F (36.7  C)  Heart Rate:  [] 101  Resp:  [16-20] 20  BP: (113-146)/(75-93) 113/75  SpO2:  [88 %-100 %] 98 %  I/O last 3 completed shifts:  In: 350 [P.O.:350]  Out: 1350 [Urine:1350]    GENERAL: Alert , awake and oriented. Frail looking, elderly gentleman NAD, continues to have some dysarthria , pleasantly confused , on and off sleeping    HEENT: Normocephalic. EOMI. No icterus or injection. Nares normal.slightly dry mucous membranes    LUNGS: Clear to auscultation. No dyspnea at rest.does have prolonged exp phase pattern in between breaths    HEART: irregular , slightly tachycardic . Extremities perfused although rt leg is cooler as compares to left leg, multiple ulcerative wounds on B/L lower extremities and back   ABDOMEN: Soft, nontender, and nondistended. Positive bowel sounds.   EXTREMITIES: No LE edema noted.   NEUROLOGIC: left sided weakness , more prominent in upper extremity 3/5 as compared to lower extremity, left facial droop , left side neglect , continues to have visual field deficit     Medications     - MEDICATION INSTRUCTIONS -         aspirin  300 mg Rectal Daily     famotidine  20 mg Intravenous Q12H BMT CSA     furosemide  10 mg Oral Daily     insulin aspart  1-4 Units Subcutaneous Q4H     metoprolol tartrate  25 mg Oral BID       Data   Recent Labs   Lab 05/24/19  0805 05/23/19  0855 05/22/19  0826 05/20/19  1543 05/20/19  0636 05/19/19  2348   WBC  --   --  7.4  --   --  5.7   HGB  --   --  11.9*  --   --  12.6*   MCV  --   --  106*  --   --  105*   PLT  --   --  203  --   --  208   INR  --   --   --   --   --  1.19*   * 146* 146*  --   --  144   POTASSIUM 3.7  --  4.2  --   --   3.8   CHLORIDE 109  --  110*  --   --  107   CO2 33*  --  29  --   --  31   BUN 22  --  29  --   --  28   CR 0.84 0.82 0.98  --   --  0.98   ANIONGAP 4  --  7  --   --  6   PRATEEK 8.2*  --  8.5  --   --  8.7   *  --  121*  --   --  121*   ALBUMIN  --   --   --   --  3.2*  --    PROTTOTAL  --   --   --   --  6.5*  --    BILITOTAL  --   --   --   --  0.7  --    ALKPHOS  --   --   --   --  81  --    ALT  --   --   --   --  36  --    AST  --   --   --   --  34  --    TROPI  --   --   --  0.095* 0.119* 0.121*       Imaging:   No results found for this or any previous visit (from the past 24 hour(s)).

## 2019-05-24 NOTE — PLAN OF CARE
Patient here with Rt occipital and Lt cerebellar strokVSS A&OX2.Neuros WITH LUE/LLE hemiparesis/neglect/numbness, left visual neglect and field cut,  blurred vision, slurred speech , irregular respiratory depth, improved after IV LAsix.Tele Afib with RVR . All wounds dressing CDI.Up with 2 strong, Walker and NAVARRO Kwon patent with 550cc output, advanced to full nectar thick liquid diet, 1:1 supervision, pills crushed in applesauce.pt sleeping intermittently but restless at times.Turn and reposition x2 hours, got pulsate mattress.Discharge pending

## 2019-05-24 NOTE — CONSULTS
"CLINICAL NUTRITION SERVICES - REASSESSMENT NOTE    New MD consult - concern for poor oral intake , please evaluate if he will benefit from supplement or calorie counting      Recommendations Ordered by Registered Dietitian (RD):   Medical Food Supplement - will send Magic Shakes with meals  Will make \"not appropriate for room service\" so that pt will get meals delivered automatically  Calorie count is not beneficial at this time given such a limited diet and poor intake     Future/Additional Recommendations:   If unable to advance diet, consider TF if pt/MD feel appropriate     Malnutrition:   % Weight Loss:  None noted  % Intake:  Decreased intake does not meet criteria for malnutrition   Subcutaneous Fat Loss:  None observed  Muscle Loss:  None observed  Fluid Retention:  None noted    Malnutrition Diagnosis: Patient does not meet two of the above criteria necessary for diagnosing malnutrition        EVALUATION OF PROGRESS TOWARD GOALS   Diet:  Full liquids, NT    Intake/Tolerance:  Pt started this diet after his VSS yesterday, prior that that he'd been NPO for 3 days.  He has not received breakfast. Doubt pt will be able to meet needs on this restricted diet.      ASSESSED NUTRITION NEEDS:  Dosing Weight (5/21) 64 kg  Estimated Energy Needs: 7632-6010 kcals (25-30 Kcal/Kg)  Justification: maintenance  Estimated Protein Needs: 75-95 grams protein (1.2-1.5 g pro/Kg)  Justification: preservation of lean body mass      NEW FINDINGS:   Vitals:    05/19/19 2346 05/20/19 0200 05/21/19 0400   Weight: 62.9 kg (138 lb 10.7 oz) 65.1 kg (143 lb 8.3 oz) 64 kg (141 lb 1.5 oz)         Previous Goals 5/21:   Pt to receive nutrition in the next 48-72 hrs  Evaluation: Not met    Previous Nutrition Diagnosis:   Inadequate protein-energy intake related to NPO status per SLP as evidenced by pt meeting 0% est needs  Evaluation: No change      MALNUTRITION  % Weight Loss:  None noted  % Intake:  Decreased intake does not meet criteria " "for malnutrition   Subcutaneous Fat Loss:  None observed  Muscle Loss:  None observed  Fluid Retention:  None noted    Malnutrition Diagnosis: Patient does not meet two of the above criteria necessary for diagnosing malnutrition      CURRENT NUTRITION DIAGNOSIS  Inadequate protein-energy intake related to swallowing issues as evidenced by previous NPO and current dysphagia diet restrictions    INTERVENTIONS  Recommendations / Nutrition Prescription  Diet per SLP  If unable to advance diet, consider TF if pt/MD feel appropriate    Implementation  Medical Food Supplement - will send Magic Shakes with meals  Will make \"not appropriate for room service\" so that pt will get meals delivered automatically  Calorie count is not beneficial at this time given such a limited diet and poor intake    Goals  Pt's nutrition POC (advance diet vs FT?) will be determined 2-3 days      MONITORING AND EVALUATION:  Progress towards goals will be monitored and evaluated per protocol and Practice Guidelines    Radha Avendano RD  Pager [phone] (M-F)            [phone] (W/E & Hol)            "

## 2019-05-24 NOTE — PLAN OF CARE
Pt here with right occipital CVA. A&O to self only - confused and forgetful. Neuros include left facial droop, left hemiparesis, left field cut and neglect, blurred vision, and slurred speech. VSS on 2L O2. Tele afib RVR. Nectar thick full liquid diet - needs encouragement to eat. Takes pills crushed in applesauce. Up with A2 + lift. Denies pain. Plan for Hospice consult at 1300 tomorrow. Discharge to TCU pending Hospice consult.

## 2019-05-25 NOTE — PLAN OF CARE
OT: Attempted to see pt for OT treatment, pt declines EOB, speaking somewhat incoherently about republicans and democrats, appears mildly agitated about mitt on R hand. Attempted to engage in AROM activities, pt not following commands.

## 2019-05-25 NOTE — CONSULTS
and JASE Marshall, met with patients spouse, Dolly and daughter Brenda on the 7th floor of [facility] to discuss Hospice philosophy, benefits and services under Medicare.  Patient did not participate in meeting, he was in therapy. Patient is a 89 y.o. male with history of CVA, left sided hemiplegia with cognitive deficits and dysphagia d/t CVA. Hospice consents were signed by spouse effective upon discharge. Patient will be discharged to [facility] on [address] tomorrow 5/26/19. JASE Wells arranged for stretcher transport at 1100 am. Liquid oxygen will be delivered to facility prior to discharge. JASE will notify spouse Dolly of time of transport. Hospice admission team will meet with family at [facility] at 100 pm. Family is aware of this. Notified Dina, Admission Coordinator at [facility] with update on patient discharge.     Discharging MD.... Please order hospice comfort meds as listed below and have these filled and sent with patient at time of discharge:    At the Facility, the medications can not have ranges and please use bubble packaging as well.    1. Lorazepam 0.5 mg tabs  Take 1/2 tablet (0.25 mg) PO/SL/NH q4hr prn for anxiety or restlessness  2. Haloperidol 0.5 mg tablet  Take 1 tablet (0.5 mg) PO/SL q6hr prn for nausea or terminal agitation  3. Atropine sulfate 1 percent drops,    place 2 drops PO/SL/BUC q2hr prn for excess secretions  4. Senna 8.6 mg tabs, take 1 tab PO BID prn for constipation  5. Acetaminophen 650 mg supp   Insert 1 supp NH q4h PRN for fever or mild pain  6. Bisacodyl 10 mg supp  Insert 1 supp NH QD prn for constipation  7. morphine sulfate solutab 2.5mg one tab SL every 2 hrs as needed pain/dyspnea #15.    Coordinated discharge planning with [facility] JASE, Lupe, Hospital Coordinator, Dina, Admissions at [facility]. Please call Hospice [phone] should discharge plans change    Thank you for this referral  Chela Chu RN

## 2019-05-25 NOTE — PLAN OF CARE
Discharge Planner SLP   Patient plan for discharge: Not stated; family meeting with Hospice at 1:00PM this date.   Current status: SLP attempted assessment of po trials this AM. Upon arrival, pt stating 02 in mid-high 60's, however stability improved following repositioning of pulse-ox to mid 90's. Pt unable to sustain alertness during po trials x2. Subsequent to entry of 1/2 tsp of thickened liquid to oral cavity, pt demonstrated absent oral-awareness requiring cues to close mouth and swallow. Pt not appropriate for further assessment at this time.      Recommend full liquid diet with NECTAR thick liquids by spoon only with RN or pt's daughter/sister (trained in swallowing strategies). Pt must be seated fully upright, on his back as able, and PO intake only allowed when pt is fully alert with a stable respiratory status. Small bites/sips, should be provided, will need 1:1 feeding assistance and supervision, PO should be held with changes in respiratory status or with any indications of aspiration.     Barriers to return to prior living situation: Dysphagia  Recommendations for discharge: TCU  Rationale for recommendations: Continue SLP for ongoing management of dysphagia.        Entered by: Kelly Palacios 05/25/2019 10:25 AM

## 2019-05-25 NOTE — CONSULTS
"[facility] 5/25/19    Hospice RN Chela Scherer and Hospice Social Work Joanna met with pt's spouse Dolly and daughter Brenda at [facility] from [id number]. Pt not able to participate in meeting d/t disorientation and potentially working with therapy shortly. Hospice team reviewed hospice philosophy, coverage, and services. Spouse and daughter in agreement with hospice philosophy and discussed that pt has expressed not wanting aggressive interventions. Daughter stated pt has been in chronic pain for years and has \"hung on\" for the sake of his spouse. Spouse signed paper consents for hospice, effective upon discharge from hospital. Family toured [facility] on [facility] this morning and are aware they will provide $5,000 upfront payment.    Lupe QUIROZCC and Russell KELLERW arranged for discharge Sunday 5/26 at 1100 to [facility] (Rm 2221) via [facility] stretcher transport. [facility] RN and SW will meet pt and family at [facility] Sun 5/26 at 1300. Writer ordered 2-4L O2 from [facility] to be delivered no later than 5/26 1100. [facility] currently only has concentrators available, but will switch out for liquid once their delivery arrives. Stretcher transport should provide O2 en route. No other DME requested by family at this time. POLST indicating DNR and comfort focus is already on file in Epic from 8/4/15.    See Chela Chu RN note for hospice medication order requests.      Thank you for the referral,    ABDOUL Hayes, [facility]  Hospice Social Worker  [facility]  [place] Office: [phone]        "

## 2019-05-25 NOTE — PLAN OF CARE
Pt Alert to self only, VSS on 2Lo2, denies pain, ricci is in place, pills are taken with applesauce, left sided weakness and neglect, Tele, will continue to monitor

## 2019-05-25 NOTE — PLAN OF CARE
PT: Chart reviewed. Per discussion with OT, pt not appropriate for PT this date. Per chart review, appears pt will disch tomorrow on hospice.

## 2019-05-25 NOTE — PROGRESS NOTES
JASE      D: JASE following for discharge needs. JASE spoke with Dina, admissions at [facility]. Patient accepted to [facility]. Patient's family toured facility and ware that the would have to private pay. Per request, JASE set up [facility] stretcher ride at 11 am on 5/26/19. JASE placed call to patient's spause Dolly to update. Unable to leave voicemail.     P: Will continue to follow

## 2019-05-25 NOTE — PLAN OF CARE
Pt here with right occipital CVA. A&O to self only - confused and forgetful. Neuros include left facial droop, left hemiparesis, left field cut and neglect, blurred vision, and slurred speech. VSS on 2L O2. Tele afib RVR. Nectar thick full liquid diet - needs encouragement to eat. Takes pills crushed in applesauce. Up with A2 + lift. Tylenol given for bilateral lower extremity pain when changing bandages. Plan for discharge to [facility] tomorrow with ride at 11:00 am.

## 2019-05-25 NOTE — PROVIDER NOTIFICATION
MD Notification    Notified Person: MD    Notified Person Name: AMANDA Ortiz    Notification Date/Time: 5/25/19 15:00    Notification Interaction: MD notification for discharge orders.  Patient to discharge to [facility] with [facility] on 5/26 SW arranging a stretcher ride 11:00.  Asking for the MD to fill meds (per Hospice RN recommendations when noted ) today in anticipation of discharge tomorrow.  No ranges.     Purpose of Notification: FYI page regarding discharge rx to be filled and discharge started for anticipated discharge with [facility] to [facility] 5/26 11:00 a.m.    Orders Received: MD to come up to start RX for discahrge writer called discharge rx to notify them that RX should be down in the a.m. For filling and no ranges/bubble packing.     Comments:

## 2019-05-26 NOTE — DISCHARGE SUMMARY
[facility]    Discharge Summary  Hospitalist    Date of Admission:  5/19/2019  Date of Discharge:  5/26/2019 12:33 PM  Discharging Provider:  NIKOLAS Ortiz MD, FACP     Date of Service (when I saw the patient): 5/26/19    Discharge Diagnoses      Acute Right occipital ischemic stroke, likely due to underlying atrial fibrillation.   Chronic left occipital ischemic stroke  Oropharyngeal dysphagia  Acute systolic congestive heart failure with possible ischemic cardiomyopathy with ejection fraction of 30 to 35%  Hypoxia  New onset atrial fibrillation  Possible Peripheral arterial disease  Hypernatremia  Low Urine out out  Multiple wounds  New onset diabetes  Urine retention  Constipation      History of Present Illness   Per initial H & P:  Pepito Hicks is a 89 year old male with history of hypertension, spina bifida, basal cell carcinoma, osteoarthritis and recent evaluation for lower extremity edema and weeping wounds who presented to the ED his wife with complaints of left-sided weakness and facial droop.  They were watching [employer] with his wife from 8 until 10 PM at which point he was unable to arise from the chair.  He was unable to see on the left side and could not lift his left arm nor his leg.  His wife also reports a facial droop left side.  At that time and currently he denies any chest pain/pressure or palpitations, dizziness, lightheadedness, fever or chills.  No alcohol intake reported.  Overall he had been at his baseline prior to these events.  For some time now it seems that he has been getting short of breath more easily especially with exertion.  They deny associated symptoms with this.  Of note he was recently seen in urgent care due to his bilateral leg edema and water blisters.  Otherwise he has been taking his amlodipine for high blood pressure as directed.      In the ED code stroke was called and he was promptly evaluated by neurology.  The decision to  administer TPA was made.  Since that time he noted improvement in left-sided motor function is noted in the upper and lower extremities.  He continues to report left-sided visual deficit.  Noticeable facial droop still present.  Currently he is afebrile hemodynamically stable with normal saturations on room air.  Telemetry and EKG do indicate irregularly irregular rhythm indicative of atrial fibrillation.  They are unaware of any history of arrhythmia.  BMP was unremarkable glucose was 121, CBC also unremarkable.  CT imaging completed as below plan will be to admit to the ICU for standard post TPA cares and further evaluation.      Hospital Course   Pepito Hicks was admitted on 5/19/2019.  The following problems were addressed during his hospitalization:    Acute suspected embolic stroke in right PCA and cerebral hemispheres bilaterally - likely due to underlying Atrial Fibrillation.  Presented with left-sided weakness and facial droop, received TPA in the emergency room 5/19.  Made limited improvements.  Neurology was consulted, as were PT/OT.  Was continued on aspirin, but no other anticoagulation was begun.    Chronic left occipital ischemic stroke; attributed to underlying atrial fibrillation.  Was monitored on telemetry initially, but this was later discontinued as family discussions turned toward hospice.     Oropharyngeal dysphagia; stable.  Diet was modified to begin full liquid diet with nectar thick liquids by spoon only; Segundo's intake was only fair to poor.      Acute systolic congestive heart failure with possible ischemic cardiomyopathy with ejection fraction of 30 to 35%: No known history of congestive heart failure or coronary artery disease.  ECHO showed moderate to severe global hypokinesia of left ventricle, decreased right ventricular systolic function and moderate biatrial enlargement.  Was continued on low-dose Lopressor and Lasix.     Hypoxia; likely multifactorial.  Desaturated  earlier this admission, and has required up to 2 or 3 Lpm via nasal cannula to maintain adequate 02 saturations.     New onset atrial fibrillation: Most likely etiology of his stroke, considering his multiple previous strokes, most likely cardioembolic phenomena might have played a role; was continued on aspirin.     Possible Peripheral arterial disease; may have contributed to wounds of bilateral lower extremities.     Hypernatremia; stable.     Low Urine out out; improved. I's/O's were monitored closely.    Multiple wounds; was evaluated by the WOCN team, who made adjustments to his topical cares.     New onset diabetes: Hemoglobin A1c 6.8%, did not have known history of diabetes mellitus.  Was treated with low-dose sliding scale insulin and accu checks were monitored.       NIKOLAS Ortiz MD, FACP       Significant Results and Procedures   See EHR and imaging reports.    Pending Results   None    Unresulted Labs Ordered in the Past 30 Days of this Admission     No orders found from 3/20/2019 to 5/20/2019.        Code Status   DNR       Primary Care Physician   Lucio Jennings    Physical Exam   Temp: 98  F (36.7  C) Temp src: Oral BP: (!) 150/99 Pulse: 114 Heart Rate: 102 Resp: 20 SpO2: 95 % O2 Device: Nasal cannula Oxygen Delivery: 2 LPM  Vitals:    05/19/19 2346 05/20/19 0200 05/21/19 0400   Weight: 62.9 kg (138 lb 10.7 oz) 65.1 kg (143 lb 8.3 oz) 64 kg (141 lb 1.5 oz)     Vital Signs with Ranges  Temp:  [98  F (36.7  C)-98.7  F (37.1  C)] 98  F (36.7  C)  Pulse:  [114] 114  Heart Rate:  [] 102  Resp:  [20] 20  BP: (128-150)/(85-99) 150/99  SpO2:  [85 %-100 %] 95 %  I/O last 3 completed shifts:  In: 510 [P.O.:510]  Out: 475 [Urine:475]    Constitutional: no acute distress; lying in bed  HEENT: AT/NC; sclerae clear. Min. Mucus at left medial orbit.  MM's sl. Dry; nasal cannula 02 in place  Respiratory: fair air entry bilaterally; decreased at lower fields w/ inspir. Rhonchi Left lower 1/3; no  "wheezing  Cardiovascular: S1, S2 present, regular rate and rhythm, without murmur, rubs or gallops  GI: flat, positive bowel sounds, soft, non-tender, non-distended  : ricci cath. present  Skin/Integumen: no edema, no cyanosis, no rashes; both lower legs with large dressings - clean/intact; Left forearm w/ dressing/gauze - clean/intact; Right hand in mitt.  Neurologic: awake, cooperative; follows most directions for exam.  Confused; asks: \"What are you gonna do about that trailer?\"    Discharge Disposition   Discharged to short-term care facility/Hospice program  Condition at discharge: Stable    Consultations This Hospital Stay   WOUND OSTOMY CONTINENCE NURSE  IP CONSULT  PHYSICAL THERAPY ADULT IP CONSULT  OCCUPATIONAL THERAPY ADULT IP CONSULT  NEUROLOGY IP CONSULT  SPEECH LANGUAGE PATH ADULT IP CONSULT  SMOKING CESSATION PROGRAM IP CONSULT  SWALLOW EVAL SPEECH PATH AT BEDSIDE IP CONSULT  SOCIAL WORK IP CONSULT  CARDIOLOGY IP CONSULT  NUTRITION SERVICES ADULT IP CONSULT  SOCIAL WORK IP CONSULT    Time Spent on this Encounter   I, Reyes Ortiz, personally saw the patient today and spent greater than 30 minutes discharging this patient.    Discharge Orders      General info for SNF    Length of Stay Estimate: Short Term Care: Estimated # of Days <30  Condition at Discharge: Stable  Level of care:skilled   Rehabilitation Potential: Fair  Admission H&P remains valid and up-to-date: Yes  Recent Chemotherapy: N/A  Use Nursing Home Standing Orders: Yes     Mantoux instructions    Give two-step Mantoux (PPD) Per Facility Policy Yes     Additional Discharge Instructions     Reason for your hospital stay    You were admitted for evaluation of weakness and confusion.     Bladder scan    X 2 for post void residual     Follow Up and recommended labs and tests    Follow up with Hospice team, MD, RN, . Later today and in the coming week. No follow up labs or test are needed.     Activity - Up with nursing assistance "     Encourage PO fluids     Wound care (specify)    Site:   Multiple sites  Instructions:  See WOCN instructions for topical care.  Nursing: OK to pre-medicate with available pain medications prior to wound care, every other day.     DNR (Do Not Resuscitate)     Oxygen - Nasal cannula    1-4 Lpm by nasal cannula to keep O2 sats 92% or greater.  If Segundo is unwilling to keep n/c on, review w/ Hospice team.     Fall precautions     Advance Diet as Tolerated    Follow this diet upon discharge: Orders Placed This Encounter      Room Service      Snacks/Supplements Adult: Other; Magic Shakes; With Meals      Combination Diet Full Liquid Diet; Nectar Thickened Liquids (pre-thickened or use instant food thickener) (By spoon)     Discharge Medications   Current Discharge Medication List      START taking these medications    Details   acetaminophen (TYLENOL) 650 MG suppository Place 1 suppository (650 mg) rectally every 4 hours as needed for fever or mild pain  Qty: 28 suppository, Refills: 1    Associated Diagnoses: Spina bifida of lumbar region without hydrocephalus (H)      bisacodyl (DULCOLAX) 10 MG suppository Place 1 suppository (10 mg) rectally daily as needed for constipation  Qty: 7 suppository, Refills: 0    Associated Diagnoses: Constipation, unspecified constipation type      haloperidol (HALDOL) 0.5 MG tablet Take 1 tablet (0.5 mg) by mouth, place under tongue or insert rectally every 6 hours as needed for agitation or other (or nausea)  Qty: 15 tablet, Refills: 0    Associated Diagnoses: Agitation      LORazepam (ATIVAN) 0.5 MG tablet Take 0.5 tablets (0.25 mg) by mouth, place under tongue or insert rectally every 4 hours as needed for agitation or anxiety (restlessness)  Qty: 24 tablet, Refills: 0    Associated Diagnoses: Agitation      morphine 2.5 MG solu-tab Place 1 tablet (2.5 mg) under the tongue every 2 hours as needed for moderate to severe pain or shortness of breath / dyspnea  Qty: 15 tablet, Refills:  0    Associated Diagnoses: Spina bifida of lumbar region without hydrocephalus (H); Pain      sennosides (SENOKOT) 8.6 MG tablet Take 1 tablet by mouth 2 times daily as needed for constipation  Qty: 15 tablet, Refills: 0    Associated Diagnoses: Constipation, unspecified constipation type         STOP taking these medications       acetaminophen (TYLENOL) 500 MG tablet Comments:   Reason for Stopping:         amLODIPine (NORVASC) 2.5 MG tablet Comments:   Reason for Stopping:             Allergies   Allergies   Allergen Reactions     Clinoril [Sulindac]      Codeine      Dolobid [Diflunisal]      GI side effects      Feldene [Piroxicam]      Influenza Vaccine Live      Became ill     Orudis [Ketoprofen]      Penicillins      Relafen [Nabumetone]      GI upset     Data   Most Recent 3 CBC's:  Recent Labs   Lab Test 05/22/19  0826 05/19/19  2348 07/15/11  1414   WBC 7.4 5.7  --    HGB 11.9* 12.6* 14.2   * 105*  --     208  --       Most Recent 3 BMP's:  Recent Labs   Lab Test 05/25/19  0830 05/24/19  0805 05/23/19  0855 05/22/19  0826   * 146* 146* 146*   POTASSIUM 3.6 3.7  --  4.2   CHLORIDE 109 109  --  110*   CO2 32 33*  --  29   BUN 21 22  --  29   CR 0.80 0.84 0.82 0.98   ANIONGAP 4 4  --  7   PRATEEK 8.4* 8.2*  --  8.5   * 123*  --  121*     Most Recent 2 LFT's:  Recent Labs   Lab Test 05/20/19  0636   AST 34   ALT 36   ALKPHOS 81   BILITOTAL 0.7     Most Recent INR's and Anticoagulation Dosing History:  Anticoagulation Dose History     Recent Dosing and Labs Latest Ref Rng & Units 5/19/2019    INR 0.86 - 1.14 1.19(H)        Most Recent 3 Troponin's:  Recent Labs   Lab Test 05/20/19  1543 05/20/19  0636 05/19/19  2348   TROPI 0.095* 0.119* 0.121*     Most Recent Cholesterol Panel:  Recent Labs   Lab Test 05/20/19  0636   CHOL 127   LDL 56   HDL 58   TRIG 63     Most Recent 6 Bacteria Isolates From Any Culture (See EPIC Reports for Culture Details):  Recent Labs   Lab Test 05/20/19  2856    CULT Methicillin resistant Staphylococcus aureus (MRSA) isolated  This isolate is presumed to be clindamycin resistant based on detection of inducible   clindamycin resistance. Erythromycin and clindamycin are resistant, therefore, they are   not recommended for use.  *     Most Recent TSH, T4 and A1c Labs:  Recent Labs   Lab Test 05/20/19  0636   TSH 2.02   A1C 6.8*     Results for orders placed or performed during the hospital encounter of 05/19/19   CTA Head Neck with Contrast    Narrative    CT ANGIOGRAM OF THE HEAD AND NECK WITH CONTRAST  5/20/2019 2:26 AM     HISTORY: Focal neuro deficit, less than six hours, stroke suspected.    TECHNIQUE:  CT angiography with an injection of 70 mL Isovue-370 IV  with scans through the head and neck. Images were transferred to a  separate 3-D workstation where multiplanar reformations and 3-D images  were created. Estimates of carotid stenoses are made relative to the  distal internal carotid artery diameters except as noted. Radiation  dose for this scan was reduced using automated exposure control,  adjustment of the mA and/or kV according to patient size, or iterative  reconstruction technique.    COMPARISON: None.     CT ANGIOGRAM HEAD FINDINGS:  The right posterior cerebral artery is  occluded at the P1-P2 junction. The left posterior cerebral artery is  patent. The internal carotid arteries, anterior cerebral arteries, and  middle cerebral arteries are patent. There is scattered  atherosclerotic plaquing throughout the carotid siphons. No aneurysms  are identified. Dural venous sinuses are without appreciable  abnormality.    CT ANGIOGRAM NECK FINDINGS:  A three-vessel aortic arch is present.  The bilateral common carotid, internal carotid, external carotid, and  vertebral arteries are patent. There is moderate plaquing at the  bilateral carotid bifurcations without evidence of flow-limiting  stenosis. There is approximately 20% narrowing of the right  internal  carotid artery at the bifurcation, and approximately 30% narrowing of  the left internal carotid artery at the bifurcation. Mild plaquing is  present at the bilateral vertebral artery origins without evidence of  flow-limiting stenosis. Severe apical lung emphysema is present. A  nonspecific 1.4 cm density is present within the left upper lobe.  Moderate to severe degenerative changes are present throughout the  cervical spine.      Impression    IMPRESSION:   1. 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 Background of scattered atherosclerotic irregularity  including high-grade stenosis of the left posterior cerebral artery  P2/3 segment junction and right anterior cerebral artery A3 segment.  2. CTA neck: Patent cervical vasculature. Moderate plaquing at the  carotid bifurcations without evidence of flow-limiting stenosis.    KASHIF CARLOS MD   CT Head w/o Contrast    Narrative    CT SCAN OF THE HEAD WITHOUT CONTRAST May 20, 2019 2:26 AM     HISTORY: Focal neuro deficit less than six hours, stroke suspected.    TECHNIQUE: Axial images of the head and coronal reformations without  IV contrast material. Radiation dose for this scan was reduced using  automated exposure control, adjustment of the mA and/or kV according  to patient size, or iterative reconstruction technique.    COMPARISON: None.    FINDINGS: Mild to moderate volume loss is present. Patchy white matter  hypoattenuation likely represents chronic small vessel ischemic  change. A 3-4 cm area of hypoattenuation is present involving the left  occipital lobe with corresponding loss of gray-white differentiation.  Old left ventral lateral thalamic lacunar infarct is present. No  evidence of hemorrhage. Parenchyma is otherwise unremarkable. The  calvarium, skull base, paranasal sinuses, and extracranial soft  tissues are unremarkable. Gaze is deviated to the right.      Impression    IMPRESSION:   1. No evidence of  acute ischemia or hemorrhage.  2. Volume loss and patchy white matter hypoattenuation likely  reflecting chronic small vessel ischemic change.  3. Old left occipital lobe infarct. Old left thalamic lacunar infarct.    Findings were discussed by phone with Dr. Page at 12:11 AM on  5/20/2019.    KASHIF CARLOS MD   CT Head w Contrast    Narrative    CT BRAIN PERFUSION 5/20/2019 2:25 AM    HISTORY: Focal neuro deficit, less than six hours, stroke suspected.  Code stroke.    TECHNIQUE: Time sequential axial CT images of the head were acquired  during the administration of 50 mL Isovue-370 IV. Color perfusion maps  of the brain were created from this time sequential axial source data.      Radiation dose for this scan was reduced using automated exposure  control, adjustment of the mA and/or kV according to patient size, or  iterative reconstruction technique.    COMPARISON: None.    FINDINGS: There is prolongation of transit times corresponding to the  right posterior cerebral artery distribution with preservation of  cerebral blood volumes. There is transit time prolongation  corresponding to the left occipital lobe with corresponding decreased  cerebral blood volumes. Asymmetric perfusion maps within the posterior  fossa are likely artifactual.      Impression    IMPRESSION: Right posterior cerebral artery distribution transit time  prolongation with preservation of cerebral blood volumes, suggesting  at risk tissue.    Findings were discussed with Dr. Page at 12:24 AM on 5/20/2019.    KASHIF CARLOS MD   US CHIN Doppler No Exercise    Narrative    ULTRASOUND ANKLE-BRACHIAL INDEX DOPPLER NO EXERCISE, 1-2 LEVELS,  BILATERAL  5/20/2019 9:51 AM     HISTORY: Adm with stroke status post TPA. Bilateral leg wounds, edema.    COMPARISON: None.    FINDINGS:  Right CHIN: Cannot be calculated secondary to noncompressible vessels.  Left CHIN: 1.19.  Right first digital pressure 0.54  Left first digital pressure  0.52    Waveforms: Triphasic on the left. Triphasic on the right posterior  tibial and biphasic in the right dorsalis pedis. Normal digital  waveforms bilaterally.      Impression    IMPRESSION:   1. Right CHIN could not be calculated secondary to noncompressible  vessels.  2. Left ABIs normal without evidence of arterial insufficiency.  3. Abnormal digital pressures bilaterally, however waveforms are  normal bilaterally.     CHIN CRITERIA:  >0.95 Normal  0.90 - 0.94 Mild  0.5 - 0.89 Moderate  0.2 - 0.49 Severe  <0.2 Critical    SNEHA ANDERSEN, DO   MR Brain w/o Contrast    Narrative    MRI OF THE BRAIN WITHOUT CONTRAST May 21, 2019 1:51 AM     HISTORY:  Focal neuro deficit greater than six hours, stroke  suspected. Right PCA stroke.     TECHNIQUE:   Brain: Axial diffusion-weighted with ADC map, T2-weighted with fat  saturation, T1-weighted and turboFLAIR and sagittal T1-weighted images  of the brain were obtained without intravenous contrast.     COMPARISON: Head CT 5/19/2019.    FINDINGS: There is an evolving right PCA territory ischemic infarct  corresponding to the occluded right posterior cerebral artery noted on  a CT angiogram from 5/19/2019. This infarct involves portions of the  right temporal and right occipital lobes as well as the right  thalamus. There is a tiny recent ischemic infarct in the superior  aspect of the left cerebellar hemisphere. There are scattered tiny  recent infarcts in the anterior aspects of the cerebral hemispheres on  both sides. Given the multiple vascular territories involved by these  ischemic infarct, embolic infarcts from a central embolic source  should be considered.    A chronic ischemic infarct involving the left occipital pole is again  noted. There is moderate diffuse cerebral volume loss. There are  numerous scattered focal and extensive confluent periventricular areas  of abnormal T2 signal hyperintensity in the cerebral white matter  bilaterally that are consistent  with sequela of chronic small vessel  ischemic disease.     The ventricles and basal cisterns are within normal limits in  configuration given the degree of cerebral volume loss. There is no  midline shift.  There are no extra-axial fluid collections. There is  no evidence for acute intracranial hemorrhage.     There is no sinusitis or mastoiditis.       Impression    IMPRESSION:    1. Multiple recent ischemic infarcts involving the cerebral  hemispheres bilaterally and left cerebellar hemisphere including a  large right PCA territory infarct. Given infarcts in multiple vascular  territories, embolic infarcts from a central embolic source should be  considered.   2. Diffuse cerebral volume loss and cerebral white matter changes  consistent with chronic small vessel ischemic disease.     CARLEE TINEO MD   XR Video Swallow w/o Esophagram    Narrative    VIDEO SWALLOWING EVALUATION   5/23/2019 11:23 AM     HISTORY:  Dysphagia.    COMPARISON: None.    FLUOROSCOPY TIME: 2 minutes.  SPOT IMAGES OR CINE RUNS: 7    FINDINGS:    Thin: Not administered.    Nectar: Marked delay in oropharyngeal swallow. Early spillage of  contrast into the vallecula. No significant penetration when given  nectar by spoon or cup. There was freida aspiration with spontaneous  cough with nectar consistency by straw.    Honey: Not administered.    Pudding: Marked delay in oropharyngeal swallow. No penetration or  aspiration.    Semisolid: Not administered.    Solid: Not administered.    This study only includes the cervical esophagus.    СЕРГЕЙ BERNSTEIN MD   XR Chest Port 1 View    Narrative    CHEST ONE VIEW UPRIGHT 5/22/2019 11:07 AM     HISTORY: Concern for fluid overload and heart failure.    COMPARISON: None.      Impression    IMPRESSION: Vascular congestion and cardiomegaly compatible with  congestive heart failure. Possible left midlung infiltrate.  Retrocardiac atelectasis and/or infiltrate minimal possible right base  infiltrate.    KULWANT  MD MALLIKA

## 2019-05-26 NOTE — PROGRESS NOTES
Paged for agitation, restlessness, pulling his sheets and nasal cannula off. Unable to take PO medications at present. Chart reviewed and he is enrolling in hospice at discharge. Careful dose of 1-2 mg IV Haldol ordered prn.

## 2019-05-26 NOTE — PLAN OF CARE
Occupational Therapy Discharge Summary    Reason for therapy discharge:    Discharged to transitional care facility.    Progress towards therapy goal(s). See goals on Care Plan in [facility] electronic health record for goal details.  Limited tolerance for therapy and discharge from facility    Therapy recommendation(s):    Defer to TCU staff

## 2019-05-26 NOTE — PLAN OF CARE
Pt alert/restless at times. Oriented to self. Haldol given for restless and agitation. VSS, requiring 2L 02. LS diminished, LLE crackles. Kwon patent, low urine OP. No BM this shift. Denies pain. Repo q2h. Diet: nectar thick/full liquid. Pills crushed with pudding. Plan for discharge to hospice at [facility] at 1100.

## 2019-05-26 NOTE — PROGRESS NOTES
[facility]    HOSPITALIST PROGRESS NOTE :   --------------------------------------------------    Date of Admission:  5/19/2019    Cumulative Summary (from previous notes): Pepito Hicks is a pleasant 89 year old gentleman with past history of essential hypertension, spina bifida, basal cell carcinoma, osteoarthritis; he has also had recent evaluation for lower extremity edema and weeping wounds.  Segundo was brought to the emergency department on 5/19/19 after his wife noticed left-sided weakness and facial droop.  He was treated with TPA with some improvement noted in his left extremity motor function, and then was admitted for further evaluation and management.  He was also found to have new onset Diabetes and possible ischemic cardiomyopathy.  Cardiology was consulted.  The patient remains somewhat tenuous, following commands at times; at others he is more confused.    Assessment & Plan   Current problems include:    Acute Right occipital ischemic stroke due to A. Fib: presented with left-sided weakness and facial droop, received TPA in the emergency room 5/19.  Gradually improving.  - anticipate further review by Neurology 5/26 (need to determine if anticoagulation is favored, and when to begin)  - continue PT/OT, increase OOB time as able  - continue ASA    Chronic left occipital ischemic stroke; most likely secondary to underlying atrial fibrillation  - continue to monitor on telemetry.    Oropharyngeal dysphagia; stable.  - continue full liquid diet with nectar thick liquids by spoon only.      Acute systolic congestive heart failure with possible ischemic cardiomyopathy with ejection fraction of 30 to 35%: No known history of congestive heart failure or coronary artery disease.  ECHO showed moderate to severe global hypokinesia of left ventricle, decreased right ventricular systolic function and moderate biatrial enlargement.    - continue low-dose Lopressor and Lasix  - will need f/u  "w/ Cardiology in 1-2 weeks after discharge    Hypoxia; likely multifactorial.  Desaturated earlier this admission, and has required up to 2 or 3 Lpm via nasal cannula.    New onset atrial fibrillation: Most likely etiology of his stroke, considering his multiple previous strokes, most likely cardioembolic phenomena might have played a role    Possible Peripheral arterial disease; per previous notes: right leg has been colder than the left. May be contributing to wounds of bilateral lower extremities  - further w/u after discharge, unless exam changes    Hypernatremia; stable.  - recheck Na in next 5-7 days     Low Urine out out; improved.  - continue monitoring I's/O's    Multiple wounds.  - continue topical care per WOCN team    New onset diabetes: Hemoglobin A1c 6.8%, Segundo did not have known history of diabetes mellitus;  probably will benefit from starting oral hypoglycemic agents at discharge.  - continue low-dose sliding scale insulin  - continue accu checks QAC and HS     Diet: full liquid, nectar thickened liquids, pre-thickened or use instant food thickener by spoon.  Also use one-to-one assistance and supervision.  Kwon Catheter: in place, indication: Retention  DVT Prophylaxis: Pneumatic Compression Devices and Anti-embolisim stockings (TEDs)  Code Status: DNR    The patient's care was discussed with the Bedside Nurse and Patient.    Disposition Plan   Expected discharge: 5/26 to Hospice at [facility].    Entered: Reyes Ortiz MD 05/25/2019, 7:54 PM    Reyes Ortiz MD, [facility]  Text Page (7am - 6pm)    ----------------------------------------------------------------------------------------------------------------------    Interval History  No new issues today; no apparent F/C/SOB or chest/abdom. Discomfort.  Says he \"hurts all over\", but is smiling as he says this.  Compliant with cares.  Remains confused during conversation.      -Data reviewed today: I reviewed all new labs and imaging results over " the last 24 hours.    I personally reviewed no images or EKG's today.    Physical Exam   Temp: 98.6  F (37  C) Temp src: Axillary BP: 130/85   Heart Rate: 101 Resp: 20 SpO2: 100 % O2 Device: Nasal cannula Oxygen Delivery: 2 LPM  Vitals:    05/19/19 2346 05/20/19 0200 05/21/19 0400   Weight: 62.9 kg (138 lb 10.7 oz) 65.1 kg (143 lb 8.3 oz) 64 kg (141 lb 1.5 oz)     Vital Signs with Ranges  Temp:  [97.6  F (36.4  C)-98.6  F (37  C)] 98.6  F (37  C)  Heart Rate:  [] 101  Resp:  [17-22] 20  BP: (128-141)/(83-89) 130/85  SpO2:  [92 %-100 %] 100 %  I/O last 3 completed shifts:  In: 670 [P.O.:670]  Out: 600 [Urine:600]    GENERAL: Awake, lying in bed.  NAD; positioned on his right side; head elevated approx. 20 degrees.   HEENT: AT/Normocephalic. EOMI. Sclerae clear. Dry mucous membranes    LUNGS: fair a/e bilaterally; decreased at both bases/lower 1/3rds.  No wheezing  HEART: RRR; S1, S2 noted.  No m/r/g noted.  Skin: multiple ulcerative wounds on B/L lower extremities and back; all covered by gauze dressings  ABDOMEN: flat, + BS, soft, nontender, and nondistended.  EXTREMITIES: Right hand/arm in soft mitt restraint. No LE edema noted.   NEUROLOGIC: no facial droop noted today; wiggles toes bilaterally, slowly.  Answers some questions appropriately, but is confused, pleasant.    Medications     - MEDICATION INSTRUCTIONS -         aspirin  81 mg Oral Daily     famotidine  20 mg Intravenous Q12H BMT CSA     furosemide  10 mg Oral Daily     insulin aspart  1-4 Units Subcutaneous Q4H     metoprolol tartrate  25 mg Oral BID       Data   Recent Labs   Lab 05/25/19  0830 05/24/19  0805 05/23/19  0855 05/22/19  0826 05/20/19  1543 05/20/19  0636 05/19/19  2348   WBC  --   --   --  7.4  --   --  5.7   HGB  --   --   --  11.9*  --   --  12.6*   MCV  --   --   --  106*  --   --  105*   PLT  --   --   --  203  --   --  208   INR  --   --   --   --   --   --  1.19*   * 146* 146* 146*  --   --  144   POTASSIUM 3.6 3.7  --   4.2  --   --  3.8   CHLORIDE 109 109  --  110*  --   --  107   CO2 32 33*  --  29  --   --  31   BUN 21 22  --  29  --   --  28   CR 0.80 0.84 0.82 0.98  --   --  0.98   ANIONGAP 4 4  --  7  --   --  6   PRATEEK 8.4* 8.2*  --  8.5  --   --  8.7   * 123*  --  121*  --   --  121*   ALBUMIN  --   --   --   --   --  3.2*  --    PROTTOTAL  --   --   --   --   --  6.5*  --    BILITOTAL  --   --   --   --   --  0.7  --    ALKPHOS  --   --   --   --   --  81  --    ALT  --   --   --   --   --  36  --    AST  --   --   --   --   --  34  --    TROPI  --   --   --   --  0.095* 0.119* 0.121*

## 2019-05-26 NOTE — PLAN OF CARE
Pt here with right occipital CVA. A&O to self only - confused and forgetful. Neuros include left facial droop, left hemiparesis, left field cut and neglect, blurred vision, and slurred speech. VSS on 2L O2. Nectar thick full liquid diet - needs encouragement to eat. Takes pills crushed in pudding. Up with A2 + lift. Tylenol given for bilateral lower extremity pain when changing bandages - recommend stronger medication at [facility] given before wound care. Plan for discharge to [facility] today with ride at 12:00 pm.

## 2019-05-26 NOTE — PLAN OF CARE
Speech Language Therapy Discharge Summary    Reason for therapy discharge:    Discharged to [facility] on hospice     Progress towards therapy goal(s). See goals on Care Plan in [facility] electronic health record for goal details.  Goals not met.  Barriers to achieving goals:   change to hospice/discharge planned 5/26 .    Therapy recommendation(s):    SLP Tx for education at [facility] if requested per family; Diet per pt/family wishes on hospice.  Last recommendation from SLP :    Recommend full liquid diet with NECTAR thick liquids by spoon only with RN or pt's daughter/sister (trained in swallowing strategies). Pt must be seated fully upright, on his back as able, and PO intake only allowed when pt is fully alert with a stable respiratory status. Small bites/sips, should be provided, will need 1:1 feeding assistance and supervision, PO should be held with changes in respiratory status or with any indications of aspiration.

## 2019-05-26 NOTE — PLAN OF CARE
Physical Therapy Discharge Summary    Reason for therapy discharge:    Discharged to [facility] with hospice care     Progress towards therapy goal(s). See goals on Care Plan in [facility] electronic health record for goal details.  Goals not met.  Barriers to achieving goals:   limited tolerance for therapy and discharge from facility.    Therapy recommendation(s):    No further therapy is recommended.  Would require TCU stay if goals become restorative.

## 2019-05-28 NOTE — LETTER
5/28/2019        RE: Pepito Hicks  [address] [facility] [address]        [facility]  PRIMARY CARE PROVIDER AND CLINIC:  Lucio Jennings MD, [address] [place] [address] / [facility] [address]  Chief Complaint   Patient presents with     Hospital F/U     [facility] Record Number:  [id number]  [facility] [[id number]]    Pepito Hicks  is a 89 year old  (3/19/1930), admitted to the above facility from  [facility]. Hospital stay 5/19/2019 through 5/26/2019..  Admitted to this facility for  rehab, medical management and nursing care.    HPI:    HPI information obtained from: facility chart records, facility staff, patient report, [facility] chart review and family/first contact wife and daughter report.   Brief Summary of Hospital Course:   He has a medical history significant for HTN, spina bifida, osteoarthritis, history of CVA and was hospitalized after presenting to the ED with left sided weakness and facial droop. He was treated with TPA. He underwent extensive work up and was found to have new onset afib,  new systolic CHF with EF 30-35% with possible ischemic cardiomyopathy and new diabetes type 2.  Neurology and Cardiology consulted. [facility] nurse consulted for multiple wounds. SPEECH THERAPY evaluated for dysphagia and started full liquid diet with nectar thick liquids. Family declined g tube placement and subsequently he was transitioned to comfort care and enrolled with [facility].     Updates on Status Since Skilled nursing Admission:      Cerebrovascular accident (CVA) due to other mechanism  Atrial fibrillation, unspecified type (H)  Acute on chronic systolic congestive heart failure (H)  Type 2 diabetes mellitus with complication, without long-term current use of insulin (H)  Multiple open wounds  Hospice care patient    Restless and short of breath at times, more  comfortable after morphine. Daughter reports he seems more restless today. Unable to tolerate anything orally.   Opens his eyes and tries to speak a few words to his wife, otherwise no response to verbalizations.   Hospice nurse is here.     CODE STATUS/ADVANCE DIRECTIVES DISCUSSION:   DNR/DNI  Patient's living condition: lives with spouse  ALLERGIES: Clinoril [sulindac]; Codeine; Dolobid [diflunisal]; Feldene [piroxicam]; Influenza vaccine live; Orudis [ketoprofen]; Penicillins; and Relafen [nabumetone]  PAST MEDICAL HISTORY:  has a past medical history of Actinic keratosis, Fibromyalgia, Hyperlipidemia, MEDICAL HISTORY OF -, Rosacea, Spina bifida without mention of hydrocephalus, unspecified region, and Unspecified essential hypertension.  PAST SURGICAL HISTORY:   has a past surgical history that includes surgical history of -  (1980); Abdomen surgery; and Spine surgery (age 10 days).  FAMILY HISTORY: family history is not on file.  SOCIAL HISTORY:   reports that he has never smoked. He has never used smokeless tobacco. He reports that he drinks alcohol. He reports that he does not use drugs.    Post Discharge Medication Reconciliation Status: discharge medications reconciled and changed, per note/orders (see AVS)    Current Outpatient Medications   Medication Sig Dispense Refill     acetaminophen (TYLENOL) 650 MG suppository Place 1 suppository (650 mg) rectally every 4 hours as needed for fever or mild pain 28 suppository 1     atropine 1 % SOLN Place 2-4 drops under the tongue every 2 hours as needed for secretions        bisacodyl (DULCOLAX) 10 MG suppository Place 1 suppository (10 mg) rectally daily as needed for constipation 7 suppository 0     haloperidol (HALDOL) 1 MG tablet Take 1 mg by mouth every 6 hours as needed for agitation or other (nausea)       LORazepam (ATIVAN) 0.5 MG tablet Take 0.5 tablets (0.25 mg) by mouth, place under tongue or insert rectally every 4 hours as needed for agitation or  anxiety (restlessness) 24 tablet 0     morphine sulfate HIGH CONCENTRATE (ROXANOL) 20 mg/mL (HIGH CONC) solution Take 7.5 mg by mouth every 2 hours as needed for shortness of breath / dyspnea or pain       morphine sulfate HIGH CONCENTRATE (ROXANOL) 20 mg/mL (HIGH CONC) solution Take 7.5 mg by mouth every 4 hours       sennosides (SENOKOT) 8.6 MG tablet Take 1 tablet by mouth 2 times daily as needed for constipation 15 tablet 0       ROS:  Unobtainable secondary to aphasia.     Vitals:  /89   Pulse 96   Temp 98  F (36.7  C)   Resp 20   SpO2 96%   Exam:  GENERAL APPEARANCE:  restless, chronically ill appearing  ENT:  Mouth and posterior oropharynx normal, moist mucous membranes  EYES:  EOM normal, conjunctiva and lids normal, PERRL  NECK:  No adenopathy,masses or thyromegaly  RESP:  respiratory effort and palpation of chest normal, no respiratory distress, diminished breath sounds bilaterally  CV:  Palpation and auscultation of heart done , regular rate and rhythm, no murmur, no edema, +2 pedal pulses, feet are cool to touch  ABDOMEN:  normal bowel sounds, soft, nontender, no hepatosplenomegaly or other masses  M/S:   moving about in bed. No joint inflammation  SKIN:  Foam dressing  left hand, dry gauze dressing both LE. Coccyx area not examined.     Lab/Diagnostic data:  Recent labs in [facility] reviewed by me today.     ASSESSMENT / PLAN:  (I63.89) Cerebrovascular accident (CVA) due to other mechanism  (primary encounter diagnosis)  (I48.91) Atrial fibrillation, unspecified type (H)  (I50.23) Acute on chronic systolic congestive heart failure (H)  (E11.8) Type 2 diabetes mellitus with complication, without long-term current use of insulin (H)  (T07.XXXA) Multiple open wounds  (Z51.5) Hospice care patient  Comment: restless, short of breath and appears uncomfortable. Family members have a good understanding of the situation and appear to be coping well.   Plan: increase morphine to 7.5 mg every 4 hrs scheduled  and prn. Continue haldol and ativan. Pressure reducing mattress will be ordered by hospice. Will assess wounds another time when he's more comfortable.   Discussed with hospice RN and facility staff.         Total time spent with patient visit at the skilled nursing facility was 40 mins including patient visit and review of past records. Greater than 50% of total time spent with counseling and coordinating care due to complexity of care, end of life care  Electronically signed by:  KRISTYN Gipson CNP                       Sincerely,        KRISTYN Gipson CNP

## 2019-05-28 NOTE — PROGRESS NOTES
[facility]  PRIMARY CARE PROVIDER AND CLINIC:  Lucio Jennings MD, [address] [place] [address] / [facility] [address]  Chief Complaint   Patient presents with     Hospital F/U     [facility] Record Number:  [id number]  [facility] of Service where encounter took place:  [facility] [[id number]]    Pepito Hicks  is a 89 year old  (3/19/1930), admitted to the above facility from  [facility]. Hospital stay 5/19/2019 through 5/26/2019..  Admitted to this facility for  rehab, medical management and nursing care.    HPI:    HPI information obtained from: facility chart records, facility staff, patient report, [facility] chart review and family/first contact wife and daughter report.   Brief Summary of Hospital Course:   He has a medical history significant for HTN, spina bifida, osteoarthritis, history of CVA and was hospitalized after presenting to the ED with left sided weakness and facial droop. He was treated with TPA. He underwent extensive work up and was found to have new onset afib,  new systolic CHF with EF 30-35% with possible ischemic cardiomyopathy and new diabetes type 2.  Neurology and Cardiology consulted. [facility] nurse consulted for multiple wounds. SPEECH THERAPY evaluated for dysphagia and started full liquid diet with nectar thick liquids. Family declined g tube placement and subsequently he was transitioned to comfort care and enrolled with [facility].     Updates on Status Since Skilled nursing Admission:      Cerebrovascular accident (CVA) due to other mechanism  Atrial fibrillation, unspecified type (H)  Acute on chronic systolic congestive heart failure (H)  Type 2 diabetes mellitus with complication, without long-term current use of insulin (H)  Multiple open wounds  Hospice care patient    Restless and short of breath at times, more comfortable after morphine. Daughter reports he seems more restless today. Unable to tolerate  anything orally.   Opens his eyes and tries to speak a few words to his wife, otherwise no response to verbalizations.   Hospice nurse is here.     CODE STATUS/ADVANCE DIRECTIVES DISCUSSION:   DNR/DNI  Patient's living condition: lives with spouse  ALLERGIES: Clinoril [sulindac]; Codeine; Dolobid [diflunisal]; Feldene [piroxicam]; Influenza vaccine live; Orudis [ketoprofen]; Penicillins; and Relafen [nabumetone]  PAST MEDICAL HISTORY:  has a past medical history of Actinic keratosis, Fibromyalgia, Hyperlipidemia, MEDICAL HISTORY OF -, Rosacea, Spina bifida without mention of hydrocephalus, unspecified region, and Unspecified essential hypertension.  PAST SURGICAL HISTORY:   has a past surgical history that includes surgical history of -  (1980); Abdomen surgery; and Spine surgery (age 10 days).  FAMILY HISTORY: family history is not on file.  SOCIAL HISTORY:   reports that he has never smoked. He has never used smokeless tobacco. He reports that he drinks alcohol. He reports that he does not use drugs.    Post Discharge Medication Reconciliation Status: discharge medications reconciled and changed, per note/orders (see AVS)    Current Outpatient Medications   Medication Sig Dispense Refill     acetaminophen (TYLENOL) 650 MG suppository Place 1 suppository (650 mg) rectally every 4 hours as needed for fever or mild pain 28 suppository 1     atropine 1 % SOLN Place 2-4 drops under the tongue every 2 hours as needed for secretions        bisacodyl (DULCOLAX) 10 MG suppository Place 1 suppository (10 mg) rectally daily as needed for constipation 7 suppository 0     haloperidol (HALDOL) 1 MG tablet Take 1 mg by mouth every 6 hours as needed for agitation or other (nausea)       LORazepam (ATIVAN) 0.5 MG tablet Take 0.5 tablets (0.25 mg) by mouth, place under tongue or insert rectally every 4 hours as needed for agitation or anxiety (restlessness) 24 tablet 0     morphine sulfate HIGH CONCENTRATE (ROXANOL) 20 mg/mL (HIGH  CONC) solution Take 7.5 mg by mouth every 2 hours as needed for shortness of breath / dyspnea or pain       morphine sulfate HIGH CONCENTRATE (ROXANOL) 20 mg/mL (HIGH CONC) solution Take 7.5 mg by mouth every 4 hours       sennosides (SENOKOT) 8.6 MG tablet Take 1 tablet by mouth 2 times daily as needed for constipation 15 tablet 0       ROS:  Unobtainable secondary to aphasia.     Vitals:  /89   Pulse 96   Temp 98  F (36.7  C)   Resp 20   SpO2 96%   Exam:  GENERAL APPEARANCE:  restless, chronically ill appearing  ENT:  Mouth and posterior oropharynx normal, moist mucous membranes  EYES:  EOM normal, conjunctiva and lids normal, PERRL  NECK:  No adenopathy,masses or thyromegaly  RESP:  respiratory effort and palpation of chest normal, no respiratory distress, diminished breath sounds bilaterally  CV:  Palpation and auscultation of heart done , regular rate and rhythm, no murmur, no edema, +2 pedal pulses, feet are cool to touch  ABDOMEN:  normal bowel sounds, soft, nontender, no hepatosplenomegaly or other masses  M/S:   moving about in bed. No joint inflammation  SKIN:  Foam dressing  left hand, dry gauze dressing both LE. Coccyx area not examined.     Lab/Diagnostic data:  Recent labs in [facility] reviewed by me today.     ASSESSMENT / PLAN:  (I63.89) Cerebrovascular accident (CVA) due to other mechanism  (primary encounter diagnosis)  (I48.91) Atrial fibrillation, unspecified type (H)  (I50.23) Acute on chronic systolic congestive heart failure (H)  (E11.8) Type 2 diabetes mellitus with complication, without long-term current use of insulin (H)  (T07.XXXA) Multiple open wounds  (Z51.5) Hospice care patient  Comment: restless, short of breath and appears uncomfortable. Family members have a good understanding of the situation and appear to be coping well.   Plan: increase morphine to 7.5 mg every 4 hrs scheduled and prn. Continue haldol and ativan. Pressure reducing mattress will be ordered by hospice. Will  assess wounds another time when he's more comfortable.   Discussed with hospice RN and facility staff.         Total time spent with patient visit at the skilled nursing facility was 40 mins including patient visit and review of past records. Greater than 50% of total time spent with counseling and coordinating care due to complexity of care, end of life care  Electronically signed by:  RKISTYN Gipson CNP

## 2019-05-31 ENCOUNTER — DOCUMENTATION ONLY (OUTPATIENT)
Dept: OTHER | Facility: CLINIC | Age: 84
End: 2019-05-31

## 2021-01-05 NOTE — PROGRESS NOTES
05/20/19 1412   General Information   Onset Date 05/19/19   Start of Care Date 05/20/19   Referring Physician Dr. Garrett   Patient Profile Review/OT: Additional Occupational Profile Info See Profile for full history and prior level of function   Patient/Family Goals Statement Pt agreed to POC goals.  No other goal stated.   Swallowing Evaluation Bedside swallow evaluation   Behaviorial Observations WNL (within normal limits);Distractible;Confused   Mode of current nutrition NPO   Respiratory Status O2 Supply  (3L - RR 9-33)   Type of O2 supply Nasal cannula   Comments Per MD note: Patient is 89-year-old male with past medical history significant for essential hypertension, spina bifida, basal cell carcinoma, osteoarthritis and recently undergoing evaluation for lower extremity edema and weeping wounds who presented to the emergency department last night as his wife noticed left-sided weakness and facial droop.  Code stroke was called and patient received TPA with some improvement noted in left extremity motor function.  Currently patient is monitored in ICU for post TPA cares.   Clinical Swallow Evaluation   Dentition lower dentures;upper dentures  (uppers slightly loose)   Mucosal Quality dry   Oral Labial Strength and Mobility impaired coordination;impaired pursing;impaired retraction;impaired seal  (deficits greater on left)   Lingual Strength and Mobility impaired protrusion;impaired coordination;impaired right lateral movement;impaired left lateral movement;impaired anterior elevation  (pull to right slightly, weakness on left)   Clinical Swallow Eval: Thin Liquid Texture Trial   Mode of Presentation, Thin Liquids fed by clinician   Volume of Liquid or Food Presented ice chip x 1   Oral Phase of Swallow   (poor awareness, mod-max cues needed to take ice chip)   Pharyngeal Phase of Swallow   (delay)   Diagnostic Statement discomfort after swallow, decreased breathing coordination, unable to rule out silent  Spoke to dr. Fountain and he is aware of the questionable reaction to the mirtazapine last week and that is why he wanted him to try a 1/2 tab initially- but he did instruct it would be ok to try the dexamethasone 2mg BID for the appetitie and the bone pain- called [facility] RN had to leave message- will escribe script and phone daughter Marisol to inform.    Spoke to patients daughter Marisol and she informed me that they think he may have had a reaction to the mirtazapine last week - the home health care Rn is there now and is going to send us a message asking Dr. Fountain if he is willing to prescribe dexamethasone as that will help appetite and also bone pain- will forward message to Dr. Jara or his recommendation          ----- Message from Rickey Fountain MD sent at 1/5/2021  8:41 AM CST -----  Regarding: Anorexia  I had this Megace with them yesterday.  I am concerned that it may increase the PSA level.  Therefore, please advise him to try mirtazapine but only half a tablet a day and see if that will be better tolerated and if it will help his appetite.  Thanks.       aspiration   Swallow Compensations   Swallow Compensations Reduce amounts   Esophageal Phase of Swallow   Patient reports or presents with symptoms of esophageal dysphagia No   Swallow Eval: Clinical Impressions   Skilled Criteria for Therapy Intervention Skilled criteria met.  Treatment indicated.   Functional Assessment Scale (FAS) 2   Treatment Diagnosis mod-severe oral-pharyngeal dysphagia   Diet texture recommendations NPO   Therapy Frequency daily   Predicted Duration of Therapy Intervention (days/wks) 1 week   Anticipated Discharge Disposition inpatient rehabilitation facility   Risks and Benefits of Treatment have been explained. Yes   Patient, family and/or staff in agreement with Plan of Care Yes   Clinical Impression Comments Patient presents with moderate-severe oral-pharyngeal dysphagia and high aspiration risk for all po intake at present.  RR rate varied from 9-33 impacting swallow and breathing coordination.  Deficits also included decreased attention/awareness, oral motor deficits greater on left, dysarthria, poor voicing, weak cough on command, and delayed swallow to ice chip trial with discomfort noted.  Unable to rule out silent aspiration at bedside.  Recommend NPO status and frequent oral cares.  Recommend consideration of alternative nutrition/hydration if deficits/risk factors do not improve.  SLP to re-assess function in swallow Tx daily as able.   Total Evaluation Time   Total Evaluation Time (Minutes) 15
